# Patient Record
Sex: FEMALE | Race: WHITE | URBAN - METROPOLITAN AREA
[De-identification: names, ages, dates, MRNs, and addresses within clinical notes are randomized per-mention and may not be internally consistent; named-entity substitution may affect disease eponyms.]

---

## 2017-01-01 ENCOUNTER — INPATIENT (INPATIENT)
Facility: HOSPITAL | Age: 82
LOS: 2 days | Discharge: NURSING, REHAB OR LONG-TERM CARE | DRG: 287 | End: 2017-01-04
Attending: INTERNAL MEDICINE | Admitting: HOSPITALIST
Payer: MEDICARE

## 2017-01-01 VITALS
TEMPERATURE: 97 F | HEIGHT: 62 IN | RESPIRATION RATE: 20 BRPM | DIASTOLIC BLOOD PRESSURE: 72 MMHG | OXYGEN SATURATION: 100 % | HEART RATE: 71 BPM | SYSTOLIC BLOOD PRESSURE: 131 MMHG

## 2017-01-01 DIAGNOSIS — I24.9 ACUTE ISCHEMIC HEART DISEASE, UNSPECIFIED: ICD-10-CM

## 2017-01-01 LAB
ALBUMIN SERPL ELPH-MCNC: 3.4 G/DL — SIGNIFICANT CHANGE UP (ref 3.3–5.2)
ALP SERPL-CCNC: 124 U/L — HIGH (ref 40–120)
ALT FLD-CCNC: 14 U/L — SIGNIFICANT CHANGE UP
ANION GAP SERPL CALC-SCNC: 12 MMOL/L — SIGNIFICANT CHANGE UP (ref 5–17)
AST SERPL-CCNC: 23 U/L — SIGNIFICANT CHANGE UP
BILIRUB SERPL-MCNC: 0.4 MG/DL — SIGNIFICANT CHANGE UP (ref 0.4–2)
BUN SERPL-MCNC: 25 MG/DL — HIGH (ref 8–20)
CALCIUM SERPL-MCNC: 9.3 MG/DL — SIGNIFICANT CHANGE UP (ref 8.6–10.2)
CHLORIDE SERPL-SCNC: 101 MMOL/L — SIGNIFICANT CHANGE UP (ref 98–107)
CK SERPL-CCNC: 21 U/L — LOW (ref 25–170)
CO2 SERPL-SCNC: 27 MMOL/L — SIGNIFICANT CHANGE UP (ref 22–29)
CREAT SERPL-MCNC: 1.03 MG/DL — SIGNIFICANT CHANGE UP (ref 0.5–1.3)
D DIMER BLD IA.RAPID-MCNC: 380 NG/ML DDU — HIGH
GLUCOSE SERPL-MCNC: 87 MG/DL — SIGNIFICANT CHANGE UP (ref 70–115)
HCT VFR BLD CALC: 38 % — SIGNIFICANT CHANGE UP (ref 37–47)
HGB BLD-MCNC: 12.6 G/DL — SIGNIFICANT CHANGE UP (ref 12–16)
MCHC RBC-ENTMCNC: 32.3 PG — HIGH (ref 27–31)
MCHC RBC-ENTMCNC: 33.2 G/DL — SIGNIFICANT CHANGE UP (ref 32–36)
MCV RBC AUTO: 97.4 FL — SIGNIFICANT CHANGE UP (ref 81–99)
PLATELET # BLD AUTO: 117 K/UL — LOW (ref 150–400)
POTASSIUM SERPL-MCNC: 4.2 MMOL/L — SIGNIFICANT CHANGE UP (ref 3.5–5.3)
POTASSIUM SERPL-SCNC: 4.2 MMOL/L — SIGNIFICANT CHANGE UP (ref 3.5–5.3)
PROT SERPL-MCNC: 7.5 G/DL — SIGNIFICANT CHANGE UP (ref 6.6–8.7)
RBC # BLD: 3.9 M/UL — LOW (ref 4.4–5.2)
RBC # FLD: 14.3 % — SIGNIFICANT CHANGE UP (ref 11–15.6)
SODIUM SERPL-SCNC: 140 MMOL/L — SIGNIFICANT CHANGE UP (ref 135–145)
TROPONIN T SERPL-MCNC: <0.01 NG/ML — SIGNIFICANT CHANGE UP (ref 0–0.06)
WBC # BLD: 4.21 K/UL — LOW (ref 4.8–10.8)
WBC # FLD AUTO: 4.21 K/UL — LOW (ref 4.8–10.8)

## 2017-01-01 PROCEDURE — 71275 CT ANGIOGRAPHY CHEST: CPT | Mod: 26

## 2017-01-01 PROCEDURE — 93010 ELECTROCARDIOGRAM REPORT: CPT

## 2017-01-01 PROCEDURE — 93010 ELECTROCARDIOGRAM REPORT: CPT | Mod: 77

## 2017-01-01 PROCEDURE — 71010: CPT | Mod: 26

## 2017-01-01 PROCEDURE — 93970 EXTREMITY STUDY: CPT | Mod: 26

## 2017-01-01 PROCEDURE — 99285 EMERGENCY DEPT VISIT HI MDM: CPT | Mod: 25

## 2017-01-01 RX ORDER — POTASSIUM CHLORIDE 20 MEQ
20 PACKET (EA) ORAL
Qty: 0 | Refills: 0 | Status: DISCONTINUED | OUTPATIENT
Start: 2017-01-01 | End: 2017-01-04

## 2017-01-01 RX ORDER — SPIRONOLACTONE 25 MG/1
25 TABLET, FILM COATED ORAL DAILY
Qty: 0 | Refills: 0 | Status: DISCONTINUED | OUTPATIENT
Start: 2017-01-01 | End: 2017-01-04

## 2017-01-01 RX ORDER — CITALOPRAM 10 MG/1
10 TABLET, FILM COATED ORAL DAILY
Qty: 0 | Refills: 0 | Status: DISCONTINUED | OUTPATIENT
Start: 2017-01-01 | End: 2017-01-04

## 2017-01-01 RX ORDER — MIRTAZAPINE 45 MG/1
15 TABLET, ORALLY DISINTEGRATING ORAL AT BEDTIME
Qty: 0 | Refills: 0 | Status: DISCONTINUED | OUTPATIENT
Start: 2017-01-01 | End: 2017-01-04

## 2017-01-01 RX ORDER — LACTOBACILLUS ACIDOPHILUS 100MM CELL
1 CAPSULE ORAL DAILY
Qty: 0 | Refills: 0 | Status: DISCONTINUED | OUTPATIENT
Start: 2017-01-01 | End: 2017-01-04

## 2017-01-01 RX ORDER — FUROSEMIDE 40 MG
40 TABLET ORAL DAILY
Qty: 0 | Refills: 0 | Status: DISCONTINUED | OUTPATIENT
Start: 2017-01-01 | End: 2017-01-04

## 2017-01-01 RX ORDER — NYSTATIN CREAM 100000 [USP'U]/G
1 CREAM TOPICAL THREE TIMES A DAY
Qty: 0 | Refills: 0 | Status: DISCONTINUED | OUTPATIENT
Start: 2017-01-01 | End: 2017-01-04

## 2017-01-01 RX ORDER — DONEPEZIL HYDROCHLORIDE 10 MG/1
10 TABLET, FILM COATED ORAL AT BEDTIME
Qty: 0 | Refills: 0 | Status: DISCONTINUED | OUTPATIENT
Start: 2017-01-01 | End: 2017-01-04

## 2017-01-01 RX ORDER — LOSARTAN POTASSIUM 100 MG/1
25 TABLET, FILM COATED ORAL DAILY
Qty: 0 | Refills: 0 | Status: DISCONTINUED | OUTPATIENT
Start: 2017-01-01 | End: 2017-01-04

## 2017-01-01 RX ORDER — CARVEDILOL PHOSPHATE 80 MG/1
3.12 CAPSULE, EXTENDED RELEASE ORAL EVERY 12 HOURS
Qty: 0 | Refills: 0 | Status: DISCONTINUED | OUTPATIENT
Start: 2017-01-01 | End: 2017-01-04

## 2017-01-01 RX ORDER — LEVOTHYROXINE SODIUM 125 MCG
200 TABLET ORAL DAILY
Qty: 0 | Refills: 0 | Status: DISCONTINUED | OUTPATIENT
Start: 2017-01-01 | End: 2017-01-04

## 2017-01-01 RX ORDER — LATANOPROST 0.05 MG/ML
1 SOLUTION/ DROPS OPHTHALMIC; TOPICAL AT BEDTIME
Qty: 0 | Refills: 0 | Status: DISCONTINUED | OUTPATIENT
Start: 2017-01-01 | End: 2017-01-04

## 2017-01-01 RX ORDER — SIMVASTATIN 20 MG/1
40 TABLET, FILM COATED ORAL AT BEDTIME
Qty: 0 | Refills: 0 | Status: DISCONTINUED | OUTPATIENT
Start: 2017-01-01 | End: 2017-01-04

## 2017-01-01 RX ORDER — ENOXAPARIN SODIUM 100 MG/ML
40 INJECTION SUBCUTANEOUS DAILY
Qty: 0 | Refills: 0 | Status: DISCONTINUED | OUTPATIENT
Start: 2017-01-02 | End: 2017-01-04

## 2017-01-01 RX ORDER — DABIGATRAN ETEXILATE MESYLATE 150 MG/1
75 CAPSULE ORAL EVERY 12 HOURS
Qty: 0 | Refills: 0 | Status: DISCONTINUED | OUTPATIENT
Start: 2017-01-01 | End: 2017-01-01

## 2017-01-01 RX ADMIN — SIMVASTATIN 40 MILLIGRAM(S): 20 TABLET, FILM COATED ORAL at 23:57

## 2017-01-01 RX ADMIN — Medication 1 DROP(S): at 18:27

## 2017-01-01 RX ADMIN — CARVEDILOL PHOSPHATE 3.12 MILLIGRAM(S): 80 CAPSULE, EXTENDED RELEASE ORAL at 18:27

## 2017-01-01 RX ADMIN — Medication 20 MILLIEQUIVALENT(S): at 18:27

## 2017-01-01 RX ADMIN — DONEPEZIL HYDROCHLORIDE 10 MILLIGRAM(S): 10 TABLET, FILM COATED ORAL at 23:56

## 2017-01-01 RX ADMIN — Medication 200 MICROGRAM(S): at 10:38

## 2017-01-01 RX ADMIN — Medication 1 DROP(S): at 10:37

## 2017-01-01 RX ADMIN — Medication 40 MILLIGRAM(S): at 10:38

## 2017-01-01 RX ADMIN — NYSTATIN CREAM 1 APPLICATION(S): 100000 CREAM TOPICAL at 10:37

## 2017-01-01 RX ADMIN — Medication 1 TABLET(S): at 10:38

## 2017-01-01 RX ADMIN — CITALOPRAM 10 MILLIGRAM(S): 10 TABLET, FILM COATED ORAL at 10:38

## 2017-01-01 RX ADMIN — CARVEDILOL PHOSPHATE 3.12 MILLIGRAM(S): 80 CAPSULE, EXTENDED RELEASE ORAL at 10:37

## 2017-01-01 RX ADMIN — MIRTAZAPINE 15 MILLIGRAM(S): 45 TABLET, ORALLY DISINTEGRATING ORAL at 23:56

## 2017-01-01 RX ADMIN — Medication 20 MILLIEQUIVALENT(S): at 10:37

## 2017-01-01 RX ADMIN — LATANOPROST 1 DROP(S): 0.05 SOLUTION/ DROPS OPHTHALMIC; TOPICAL at 23:58

## 2017-01-01 NOTE — ED ADULT NURSE NOTE - PMH
Adult Hypothyroidism (ICD9 244.9)    Afib    Benign Essential Hypertension (ICD9 401.1)    Chronic pain syndrome    Enterocolitis due to Clostridium difficile  from 8/5/2016  Glaucoma    History of Transient Ischemic Attack (TIA) (ICD9 V12.54)    Hyperlipidemia LDL Goal < 100 (ICD9 272.4)    Mitral Regurgitation (ICD9 424.0)    Polymyalgia rheumatica    Rhabdomyolysis

## 2017-01-01 NOTE — ED PROVIDER NOTE - OBJECTIVE STATEMENT
An 83 year old female pt presents to the ED c/o CP. The pt was lying in bed when she had a sudden onset of right sided chest pain that she describes as a pressure that does not radiate. Pt denies any associated SOB or diaphoresis. She reports that the pain lasted for approx. 20 minutes. She was given 1 sublingual nitro en route to the ED, which she states helped her pain. Pt is from [facility]. She has a hx of pacemaker placement. No further complaints at this time.

## 2017-01-01 NOTE — H&P ADULT. - HISTORY OF PRESENT ILLNESS
82 y/o female sent from [facility] for chest pain, mid-sternal, aching, non-radiating, occurred while sitting, not associated with SOB, Nausea or vomiting, lasting only few mins. Was not given any medications prior to resolution, no prior episode. Uses a walker for safe ambulation. Denies: fever, recent illness, cough, pleuritic chest pain, reproducible chest pain, chest trauma, nausea, vomiting, diarrhea.   Cardiac history from chart consist of, pt unable to recall, CAD s/p stent in the LAD 2002, Cardiomyopathy, HFrEF, mod-severe mitral insufficiency and moderate pulmonary HTN.

## 2017-01-01 NOTE — ED PROVIDER NOTE - MEDICAL DECISION MAKING DETAILS
An 83 year old female pt presents to the ED c/o CP. Will check labs, EKG, and re-evaluate. An 83 year old female pt presents to the ED c/o CP. Will check labs, EKG, and re-evaluate.admit for acs

## 2017-01-01 NOTE — CONSULT NOTE ADULT - SUBJECTIVE AND OBJECTIVE BOX
[facility], [facility]                                   [address] [place] [address]                                                      PHONE: [phone]                                                         FAX: [phone]  [email or web address]  ---------------------------------------------------------------------------------------------------------------------------------    Reason for Consult: CP     HPI:  PADMINI VERMA is an 83y Female severe cardiomyopathy EF < 20% CAD remote LAD PCI 2002 Bio MVR 2009 moderate to severe TR/PHTN low flow aortic stenosis s/p BiV PAF on Pradaxa HFrEF who presents with one episode of chest pressure at rest ~ 10 to 15 minutes without dyspnea or orthopnea; patient complaint with CV medications; no recent cardiac work for the drop LV function      PAST MEDICAL & SURGICAL HISTORY:  Polymyalgia rheumatica  Afib  Glaucoma  Enterocolitis due to Clostridium difficile: from 8/5/2016  Chronic pain syndrome  Rhabdomyolysis  Benign Essential Hypertension (ICD9 401.1)  History of Transient Ischemic Attack (TIA) (ICD9 V12.54)  Adult Hypothyroidism (ICD9 244.9)  Mitral Regurgitation (ICD9 424.0)  Hyperlipidemia LDL Goal &lt; 100 (ICD9 272.4)  S/P cataract surgery: right eye  AICD (automatic cardioverter/defibrillator) present  S/P Knee Replacement (ICD9 V43.65)  History of Carotid Endarterectomy (ICD9 V45.89)      sulfamethoxazole (Other)      MEDICATIONS  (STANDING):  carvedilol 3.125milliGRAM(s) Oral every 12 hours  mirtazapine 15milliGRAM(s) Oral at bedtime  lactobacillus acidophilus 1Tablet(s) Oral daily  donepezil 10milliGRAM(s) Oral at bedtime  furosemide    Tablet 40milliGRAM(s) Oral daily  latanoprost 0.005% Ophthalmic Solution 1Drop(s) Both EYES at bedtime  levothyroxine 200MICROGram(s) Oral daily  Nephro-all 1Tablet(s) Oral daily  citalopram 10milliGRAM(s) Oral daily  simvastatin 40milliGRAM(s) Oral at bedtime  nystatin Powder 1Application(s) Topical three times a day  potassium chloride    Tablet ER 20milliEquivalent(s) Oral two times a day  artificial  tears Solution 1Drop(s) Both EYES two times a day  spironolactone 25milliGRAM(s) Oral daily  losartan 25milliGRAM(s) Oral daily    MEDICATIONS  (PRN):      Social History:  Cigarettes:     none               Alchohol:     none            Illicit Drug Abuse:  none    ROS: Negative other than as mentioned in HPI.    Vital Signs Last 24 Hrs  T(C): 36.4, Max: 36.6 (01-01 @ 01:51)  T(F): 97.5, Max: 97.9 (01-01 @ 01:51)  HR: 65 (59 - 71)  BP: 115/54 (97/69 - 131/72)  BP(mean): --  RR: 20 (20 - 22)  SpO2: 99% (97% - 100%)  ICU Vital Signs Last 24 Hrs  PADMINI VERMA  I&O's Detail    I&O's Summary    Drug Dosing Weight  PADMINI BAYRON      PHYSICAL EXAM:  General: Appears well developed, well nourished alert and cooperative.  HEENT: Head; normocephalic, atraumatic.  Eyes: Pupils reactive, cornea wnl.  Neck: Supple, no nodes adenopathy, no NVD or carotid bruit or thyromegaly.  CARDIOVASCULAR: Normal S1 and S2, 3/6 murmur, rub, gallop or lift.   LUNGS: No rales, rhonchi or wheeze. Normal breath sounds bilaterally.  ABDOMEN: Soft, nontender without mass or organomegaly. bowel sounds normoactive.  EXTREMITIES: No clubbing, cyanosis or edema. Distal pulses wnl.   SKIN: warm and dry with normal turgor.  NEURO: Alert/oriented x 3/normal motor exam. No pathologic reflexes.    PSYCH: normal affect.        LABS:                        12.6   4.21  )-----------( 117      ( 01 Jan 2017 01:32 )             38.0     01 Jan 2017 01:32    140    |  101    |  25.0   ----------------------------<  87     4.2     |  27.0   |  1.03     Ca    9.3        01 Jan 2017 01:32    TPro  7.5    /  Alb  3.4    /  TBili  0.4    /  DBili  x      /  AST  23     /  ALT  14     /  AlkPhos  124    01 Jan 2017 01:32    PADMINI BAYRON  CARDIAC MARKERS ( 01 Jan 2017 01:32 )  x     / <0.01 ng/mL / 21 U/L / x     / x                RADIOLOGY & ADDITIONAL STUDIES:    INTERPRETATION OF TELEMETRY (personally reviewed):    ECG: paced     ECHO: pending     CARDIAC CATHETERIZATION: pending     Assessment and Plan:    83y Female mild dementia severe cardiomyopathy EF < 20% CAD remote LAD PCI 2002 Bio MVR 2009 moderate to severe TR/PHTN low flow aortic stenosis s/p BiV PAF on Pradaxa HFrEF who presents with one episode of chest pressure at rest ~ 10 to 15 minutes without dyspnea or orthopnea; patient complaint with CV medications; no recent cardiac work for the drop LV function; no evidence of decompensated heart failure and trop negative x 1    Plan  1.  Hold Pradaxa for a right and left heart cath Tues to evaluate new drop in LV function   2.  Awaiting a repeat ECHO to re evaluate LV function and to rule out any significant valvular disease  3.  Patient should be on warfarin post cath since patient has valvular PAF   4.  Start ARB and Aldactone therapy for severe cardiomyopathy   5.  Continue Lasix and Coreg   6.  Serial trop

## 2017-01-01 NOTE — ED PROVIDER NOTE - PMH
Adult Hypothyroidism (ICD9 244.9)    Benign Essential Hypertension (ICD9 401.1)    History of Transient Ischemic Attack (TIA) (ICD9 V12.54)    Hyperlipidemia LDL Goal < 100 (ICD9 272.4)    Mitral Regurgitation (ICD9 424.0)

## 2017-01-01 NOTE — H&P ADULT. - RS GEN PE MLT RESP DETAILS PC
no intercostal retractions/no subcutaneous emphysema/no rales/respirations non-labored/clear to auscultation bilaterally/no wheezes/no rhonchi/no chest wall tenderness

## 2017-01-01 NOTE — CHART NOTE - NSCHARTNOTEFT_GEN_A_CORE
Patient lying in bed  She is in no distress  No further chest pain    Elevated BNP and leg edema    Assess Ultrasound of the lower extremity.    Atypical chest pain  cardiac enzymes  cardiac monitoring  echo  if negative may return to skilled nursing facility.

## 2017-01-01 NOTE — H&P ADULT. - GASTROINTESTINAL DETAILS
soft/no masses palpable/nontender/no rigidity/no distention/no organomegaly/normal/no bruit/no rebound tenderness/bowel sounds normal/no guarding

## 2017-01-01 NOTE — ED PROVIDER NOTE - PSH
AICD (automatic cardioverter/defibrillator) present    History of Carotid Endarterectomy (ICD9 V45.89)    S/P cataract surgery  right eye  S/P Knee Replacement (ICD9 V43.65)

## 2017-01-01 NOTE — ED ADULT NURSE NOTE - OBJECTIVE STATEMENT
zaheer received from ems room - awake alert and oriented patient on cardiac monitor showing christopher toney at 60- - zaheer states that she was laying in bed watching tv and developed chest pain across her breast bone, called the RN in the facility because it wouldn't go away - patient transferred to ER. states at this time, she denies pain at this time continue to monitor - tolerating 2 liters nasal cannula

## 2017-01-01 NOTE — H&P ADULT. - ASSESSMENT
ACS – atypical chest pain  -	Admit to telemetry  -	NC, Coreg, statin  -	Cardiology consult [facility]  -	f/u CEx2, first set negative, lipid panel and A1c  -	resume NH medications   o	A.Fib cont pradaxa

## 2017-01-02 DIAGNOSIS — E78.2 MIXED HYPERLIPIDEMIA: ICD-10-CM

## 2017-01-02 DIAGNOSIS — H40.9 UNSPECIFIED GLAUCOMA: ICD-10-CM

## 2017-01-02 DIAGNOSIS — I24.9 ACUTE ISCHEMIC HEART DISEASE, UNSPECIFIED: ICD-10-CM

## 2017-01-02 LAB
ANION GAP SERPL CALC-SCNC: 13 MMOL/L — SIGNIFICANT CHANGE UP (ref 5–17)
BUN SERPL-MCNC: 25 MG/DL — HIGH (ref 8–20)
CALCIUM SERPL-MCNC: 9.1 MG/DL — SIGNIFICANT CHANGE UP (ref 8.6–10.2)
CHLORIDE SERPL-SCNC: 102 MMOL/L — SIGNIFICANT CHANGE UP (ref 98–107)
CO2 SERPL-SCNC: 29 MMOL/L — SIGNIFICANT CHANGE UP (ref 22–29)
CREAT SERPL-MCNC: 1.11 MG/DL — SIGNIFICANT CHANGE UP (ref 0.5–1.3)
GLUCOSE SERPL-MCNC: 79 MG/DL — SIGNIFICANT CHANGE UP (ref 70–115)
HCT VFR BLD CALC: 40.4 % — SIGNIFICANT CHANGE UP (ref 37–47)
HGB BLD-MCNC: 13.2 G/DL — SIGNIFICANT CHANGE UP (ref 12–16)
MAGNESIUM SERPL-MCNC: 2.4 MG/DL — SIGNIFICANT CHANGE UP (ref 1.8–2.5)
MCHC RBC-ENTMCNC: 32.2 PG — HIGH (ref 27–31)
MCHC RBC-ENTMCNC: 32.7 G/DL — SIGNIFICANT CHANGE UP (ref 32–36)
MCV RBC AUTO: 98.5 FL — SIGNIFICANT CHANGE UP (ref 81–99)
PHOSPHATE SERPL-MCNC: 3.9 MG/DL — SIGNIFICANT CHANGE UP (ref 2.4–4.7)
PLATELET # BLD AUTO: 107 K/UL — LOW (ref 150–400)
POTASSIUM SERPL-MCNC: 4.7 MMOL/L — SIGNIFICANT CHANGE UP (ref 3.5–5.3)
POTASSIUM SERPL-SCNC: 4.7 MMOL/L — SIGNIFICANT CHANGE UP (ref 3.5–5.3)
RBC # BLD: 4.1 M/UL — LOW (ref 4.4–5.2)
RBC # FLD: 14.3 % — SIGNIFICANT CHANGE UP (ref 11–15.6)
SODIUM SERPL-SCNC: 144 MMOL/L — SIGNIFICANT CHANGE UP (ref 135–145)
WBC # BLD: 3.58 K/UL — LOW (ref 4.8–10.8)
WBC # FLD AUTO: 3.58 K/UL — LOW (ref 4.8–10.8)

## 2017-01-02 PROCEDURE — 93306 TTE W/DOPPLER COMPLETE: CPT | Mod: 26

## 2017-01-02 PROCEDURE — 99233 SBSQ HOSP IP/OBS HIGH 50: CPT

## 2017-01-02 RX ORDER — CITALOPRAM 10 MG/1
1 TABLET, FILM COATED ORAL
Qty: 0 | Refills: 0 | COMMUNITY

## 2017-01-02 RX ORDER — MIRTAZAPINE 45 MG/1
1 TABLET, ORALLY DISINTEGRATING ORAL
Qty: 0 | Refills: 0 | COMMUNITY

## 2017-01-02 RX ORDER — FUROSEMIDE 40 MG
1 TABLET ORAL
Qty: 0 | Refills: 0 | COMMUNITY

## 2017-01-02 RX ORDER — CARVEDILOL PHOSPHATE 80 MG/1
1 CAPSULE, EXTENDED RELEASE ORAL
Qty: 0 | Refills: 0 | COMMUNITY

## 2017-01-02 RX ORDER — SIMVASTATIN 20 MG/1
1 TABLET, FILM COATED ORAL
Qty: 0 | Refills: 0 | COMMUNITY

## 2017-01-02 RX ORDER — LACTOBACILLUS ACIDOPHILUS 100MM CELL
0 CAPSULE ORAL
Qty: 0 | Refills: 0 | COMMUNITY

## 2017-01-02 RX ORDER — LATANOPROST 0.05 MG/ML
1 SOLUTION/ DROPS OPHTHALMIC; TOPICAL
Qty: 0 | Refills: 0 | COMMUNITY

## 2017-01-02 RX ORDER — MENTHOL AND METHYL SALICYLATE 10; 30 G/100G; G/100G
0 STICK TOPICAL
Qty: 0 | Refills: 0 | COMMUNITY

## 2017-01-02 RX ORDER — DONEPEZIL HYDROCHLORIDE 10 MG/1
1 TABLET, FILM COATED ORAL
Qty: 0 | Refills: 0 | COMMUNITY

## 2017-01-02 RX ORDER — LATANOPROST 0.05 MG/ML
0 SOLUTION/ DROPS OPHTHALMIC; TOPICAL
Qty: 0 | Refills: 0 | COMMUNITY

## 2017-01-02 RX ORDER — POTASSIUM CHLORIDE 20 MEQ
1 PACKET (EA) ORAL
Qty: 0 | Refills: 0 | COMMUNITY

## 2017-01-02 RX ORDER — DABIGATRAN ETEXILATE MESYLATE 150 MG/1
1 CAPSULE ORAL
Qty: 0 | Refills: 0 | COMMUNITY

## 2017-01-02 RX ORDER — NYSTATIN CREAM 100000 [USP'U]/G
0 CREAM TOPICAL
Qty: 0 | Refills: 0 | COMMUNITY

## 2017-01-02 RX ORDER — LEVOTHYROXINE SODIUM 125 MCG
1 TABLET ORAL
Qty: 0 | Refills: 0 | COMMUNITY

## 2017-01-02 RX ADMIN — ENOXAPARIN SODIUM 40 MILLIGRAM(S): 100 INJECTION SUBCUTANEOUS at 13:58

## 2017-01-02 RX ADMIN — Medication 1 TABLET(S): at 13:59

## 2017-01-02 RX ADMIN — CARVEDILOL PHOSPHATE 3.12 MILLIGRAM(S): 80 CAPSULE, EXTENDED RELEASE ORAL at 06:30

## 2017-01-02 RX ADMIN — LOSARTAN POTASSIUM 25 MILLIGRAM(S): 100 TABLET, FILM COATED ORAL at 06:31

## 2017-01-02 RX ADMIN — CITALOPRAM 10 MILLIGRAM(S): 10 TABLET, FILM COATED ORAL at 13:59

## 2017-01-02 RX ADMIN — MIRTAZAPINE 15 MILLIGRAM(S): 45 TABLET, ORALLY DISINTEGRATING ORAL at 21:41

## 2017-01-02 RX ADMIN — SPIRONOLACTONE 25 MILLIGRAM(S): 25 TABLET, FILM COATED ORAL at 08:19

## 2017-01-02 RX ADMIN — Medication 1 DROP(S): at 06:34

## 2017-01-02 RX ADMIN — Medication 40 MILLIGRAM(S): at 06:31

## 2017-01-02 RX ADMIN — Medication 20 MILLIEQUIVALENT(S): at 06:31

## 2017-01-02 RX ADMIN — CARVEDILOL PHOSPHATE 3.12 MILLIGRAM(S): 80 CAPSULE, EXTENDED RELEASE ORAL at 19:36

## 2017-01-02 RX ADMIN — Medication 200 MICROGRAM(S): at 06:31

## 2017-01-02 RX ADMIN — Medication 20 MILLIEQUIVALENT(S): at 19:36

## 2017-01-02 RX ADMIN — LATANOPROST 1 DROP(S): 0.05 SOLUTION/ DROPS OPHTHALMIC; TOPICAL at 21:41

## 2017-01-02 RX ADMIN — SIMVASTATIN 40 MILLIGRAM(S): 20 TABLET, FILM COATED ORAL at 21:41

## 2017-01-02 RX ADMIN — DONEPEZIL HYDROCHLORIDE 10 MILLIGRAM(S): 10 TABLET, FILM COATED ORAL at 21:41

## 2017-01-03 LAB
ANION GAP SERPL CALC-SCNC: 12 MMOL/L — SIGNIFICANT CHANGE UP (ref 5–17)
BUN SERPL-MCNC: 32 MG/DL — HIGH (ref 8–20)
CALCIUM SERPL-MCNC: 9.2 MG/DL — SIGNIFICANT CHANGE UP (ref 8.6–10.2)
CHLORIDE SERPL-SCNC: 102 MMOL/L — SIGNIFICANT CHANGE UP (ref 98–107)
CO2 SERPL-SCNC: 27 MMOL/L — SIGNIFICANT CHANGE UP (ref 22–29)
CREAT SERPL-MCNC: 1.22 MG/DL — SIGNIFICANT CHANGE UP (ref 0.5–1.3)
GLUCOSE SERPL-MCNC: 90 MG/DL — SIGNIFICANT CHANGE UP (ref 70–115)
HCT VFR BLD CALC: 37.6 % — SIGNIFICANT CHANGE UP (ref 37–47)
HGB BLD-MCNC: 12.3 G/DL — SIGNIFICANT CHANGE UP (ref 12–16)
MAGNESIUM SERPL-MCNC: 2.4 MG/DL — SIGNIFICANT CHANGE UP (ref 1.8–2.5)
MCHC RBC-ENTMCNC: 32 PG — HIGH (ref 27–31)
MCHC RBC-ENTMCNC: 32.7 G/DL — SIGNIFICANT CHANGE UP (ref 32–36)
MCV RBC AUTO: 97.9 FL — SIGNIFICANT CHANGE UP (ref 81–99)
PHOSPHATE SERPL-MCNC: 4.1 MG/DL — SIGNIFICANT CHANGE UP (ref 2.4–4.7)
PLATELET # BLD AUTO: 110 K/UL — LOW (ref 150–400)
POTASSIUM SERPL-MCNC: 4.8 MMOL/L — SIGNIFICANT CHANGE UP (ref 3.5–5.3)
POTASSIUM SERPL-SCNC: 4.8 MMOL/L — SIGNIFICANT CHANGE UP (ref 3.5–5.3)
RBC # BLD: 3.84 M/UL — LOW (ref 4.4–5.2)
RBC # FLD: 14.2 % — SIGNIFICANT CHANGE UP (ref 11–15.6)
SODIUM SERPL-SCNC: 141 MMOL/L — SIGNIFICANT CHANGE UP (ref 135–145)
TSH SERPL-MCNC: 0.65 UIU/ML — SIGNIFICANT CHANGE UP (ref 0.49–4.67)
WBC # BLD: 4.11 K/UL — LOW (ref 4.8–10.8)
WBC # FLD AUTO: 4.11 K/UL — LOW (ref 4.8–10.8)

## 2017-01-03 PROCEDURE — 99233 SBSQ HOSP IP/OBS HIGH 50: CPT

## 2017-01-03 RX ADMIN — DONEPEZIL HYDROCHLORIDE 10 MILLIGRAM(S): 10 TABLET, FILM COATED ORAL at 22:33

## 2017-01-03 RX ADMIN — SIMVASTATIN 40 MILLIGRAM(S): 20 TABLET, FILM COATED ORAL at 22:36

## 2017-01-03 RX ADMIN — SPIRONOLACTONE 25 MILLIGRAM(S): 25 TABLET, FILM COATED ORAL at 06:29

## 2017-01-03 RX ADMIN — LOSARTAN POTASSIUM 25 MILLIGRAM(S): 100 TABLET, FILM COATED ORAL at 06:30

## 2017-01-03 RX ADMIN — Medication 1 TABLET(S): at 17:25

## 2017-01-03 RX ADMIN — Medication 40 MILLIGRAM(S): at 06:29

## 2017-01-03 RX ADMIN — CARVEDILOL PHOSPHATE 3.12 MILLIGRAM(S): 80 CAPSULE, EXTENDED RELEASE ORAL at 17:25

## 2017-01-03 RX ADMIN — Medication 1 DROP(S): at 06:30

## 2017-01-03 RX ADMIN — NYSTATIN CREAM 1 APPLICATION(S): 100000 CREAM TOPICAL at 06:30

## 2017-01-03 RX ADMIN — CARVEDILOL PHOSPHATE 3.12 MILLIGRAM(S): 80 CAPSULE, EXTENDED RELEASE ORAL at 06:30

## 2017-01-03 RX ADMIN — Medication 200 MICROGRAM(S): at 06:30

## 2017-01-03 RX ADMIN — NYSTATIN CREAM 1 APPLICATION(S): 100000 CREAM TOPICAL at 17:27

## 2017-01-03 RX ADMIN — LATANOPROST 1 DROP(S): 0.05 SOLUTION/ DROPS OPHTHALMIC; TOPICAL at 22:36

## 2017-01-03 RX ADMIN — Medication 1 DROP(S): at 17:26

## 2017-01-03 RX ADMIN — MIRTAZAPINE 15 MILLIGRAM(S): 45 TABLET, ORALLY DISINTEGRATING ORAL at 22:36

## 2017-01-03 RX ADMIN — CITALOPRAM 10 MILLIGRAM(S): 10 TABLET, FILM COATED ORAL at 17:25

## 2017-01-03 RX ADMIN — Medication 20 MILLIEQUIVALENT(S): at 17:24

## 2017-01-03 RX ADMIN — Medication 20 MILLIEQUIVALENT(S): at 06:29

## 2017-01-03 NOTE — DISCHARGE NOTE ADULT - SECONDARY DIAGNOSIS.
Chronic atrial fibrillation Essential hypertension Mixed hyperlipidemia Open-angle glaucoma, unspecified glaucoma stage, unspecified laterality, unspecified open-angle glaucoma type

## 2017-01-03 NOTE — DISCHARGE NOTE ADULT - PATIENT PORTAL LINK FT
“You can access the [facility] Patient Portal, offered by [facility], by registering with the following website: [email or web address]”

## 2017-01-03 NOTE — DISCHARGE NOTE ADULT - MEDICATION SUMMARY - MEDICATIONS TO TAKE
I will START or STAY ON the medications listed below when I get home from the hospital:    spironolactone 25 mg oral tablet  -- 1 tab(s) by mouth once a day  -- Indication: For ChF    losartan 25 mg oral tablet  -- 1 tab(s) by mouth once a day  -- Indication: For ChF    dabigatran 75 mg oral capsule  -- 1 cap(s) by mouth 2 times a day  -- Indication: For A Fib    mirtazapine 15 mg oral tablet  -- 1 tab(s) by mouth once a day (at bedtime)  -- Indication: For Depression    citalopram 10 mg oral tablet  -- 1 tab(s) by mouth once a day  -- Indication: For Depression    simvastatin 40 mg oral tablet  -- 1 tab(s) by mouth once a day (at bedtime)  -- Indication: For Hyperlipidemia    carvedilol 3.125 mg oral tablet  -- 1 tab(s) by mouth 2 times a day  -- Indication: For HTN    donepezil 10 mg oral tablet  -- 1 tab(s) by mouth once a day (at bedtime)  -- Indication: For Dementia    nystatin 100,000 units/g topical powder  -- Apply on skin to affected area 2 times a day  -- Indication: For Rash    menthol 5% topical pad  -- Apply on skin to affected area once a day (at bedtime). take off in morning  -- Indication: For Rash    furosemide 40 mg oral tablet  -- 1 tab(s) by mouth 2 times a day  -- Indication: For ChF    potassium chloride 20 mEq oral tablet, extended release  -- 1 tab(s) by mouth 2 times a day  -- Indication: For Potassium Deficiency    Lubricant Eye Drops  -- 1 drop(s) to each affected eye 2 times a day  -- Indication: For Dry eyes    latanoprost 0.005% ophthalmic solution  -- 1 drop(s) to each affected eye once a day (in the evening)  -- Indication: For Glaucoma    Acidophilus oral capsule  -- 1 cap(s) by mouth 2 times a day  -- Indication: For Supplement    levothyroxine 200 mcg (0.2 mg) oral tablet  -- 1 tab(s) by mouth once a day  -- Indication: For Hypothyroid    multivitamin  -- 1 tab(s) by mouth once a day  -- Indication: For Supplement

## 2017-01-03 NOTE — DISCHARGE NOTE ADULT - PLAN OF CARE
No heavy lifting, driving, sex, tub baths, swimming, or any activity that submerges the lower half of the body in water for 48 hours.  Limited walking and stairs for 48 hours.    Change the bandaid after 24 hours and every 24 hours after that.  Keep the puncture site dry and covered with a bandaid until a scab forms.    Observe the site frequently.  If bleeding or a large lump (the size of a golf ball or bigger) occurs lie flat, apply continuous direct pressure just above the puncture site for at least 10 minutes, and notify your physician immediately.  If the bleeding cannot be controlled, call 911 immediately for assistance.  Notify your physician of pain, swelling or any drainage.    Notify your physician immediately if coldness, numbness, discoloration or pain in your foot occurs. Follow up with cardiology Continue pradaxa Monitor BP Monitor LDL Continue drops

## 2017-01-03 NOTE — DISCHARGE NOTE ADULT - HOSPITAL COURSE
84 y/o female sent from [facility] for chest pain, mid-sternal, aching, non-radiating, occurred while sitting, not associated with SOB, Nausea or vomiting, lasting only few mins. Was not given any medications prior to resolution, no prior episode. Uses a walker for safe ambulation. Denies: fever, recent illness, cough, pleuritic chest pain, reproducible chest pain, chest trauma, nausea, vomiting, diarrhea.   Cardiac history from chart consist of, pt unable to recall, CAD s/p stent in the LAD 2002, Cardiomyopathy, HFrEF, mod-severe mitral insufficiency and moderate pulmonary HTN.   Patient was admitted to telemetry. She under went cardiac catheterization which revealed aortic stenosis, no severe coronary artery disease. The patient does not want to pursue any other invasive test at this time such as JOAN.  She is scheduled to return to [facility] and follow up with [facility] cardiology as an outpatient.

## 2017-01-03 NOTE — DISCHARGE NOTE ADULT - CARE PLAN
Instructions for follow-up, activity and diet:	No heavy lifting, driving, sex, tub baths, swimming, or any activity that submerges the lower half of the body in water for 48 hours.  Limited walking and stairs for 48 hours.    Change the bandaid after 24 hours and every 24 hours after that.  Keep the puncture site dry and covered with a bandaid until a scab forms.    Observe the site frequently.  If bleeding or a large lump (the size of a golf ball or bigger) occurs lie flat, apply continuous direct pressure just above the puncture site for at least 10 minutes, and notify your physician immediately.  If the bleeding cannot be controlled, call 911 immediately for assistance.  Notify your physician of pain, swelling or any drainage.    Notify your physician immediately if coldness, numbness, discoloration or pain in your foot occurs. Principal Discharge DX:	ACS (acute coronary syndrome)  Goal:	Follow up with cardiology  Instructions for follow-up, activity and diet:	No heavy lifting, driving, sex, tub baths, swimming, or any activity that submerges the lower half of the body in water for 48 hours.  Limited walking and stairs for 48 hours.    Change the bandaid after 24 hours and every 24 hours after that.  Keep the puncture site dry and covered with a bandaid until a scab forms.    Observe the site frequently.  If bleeding or a large lump (the size of a golf ball or bigger) occurs lie flat, apply continuous direct pressure just above the puncture site for at least 10 minutes, and notify your physician immediately.  If the bleeding cannot be controlled, call 911 immediately for assistance.  Notify your physician of pain, swelling or any drainage.    Notify your physician immediately if coldness, numbness, discoloration or pain in your foot occurs.  Secondary Diagnosis:	Chronic atrial fibrillation  Goal:	Continue pradaxa  Secondary Diagnosis:	Essential hypertension  Instructions for follow-up, activity and diet:	Monitor BP  Secondary Diagnosis:	Mixed hyperlipidemia  Instructions for follow-up, activity and diet:	Monitor LDL  Secondary Diagnosis:	Open-angle glaucoma, unspecified glaucoma stage, unspecified laterality, unspecified open-angle glaucoma type  Instructions for follow-up, activity and diet:	Continue drops

## 2017-01-04 VITALS
SYSTOLIC BLOOD PRESSURE: 104 MMHG | TEMPERATURE: 98 F | DIASTOLIC BLOOD PRESSURE: 66 MMHG | RESPIRATION RATE: 20 BRPM | HEART RATE: 64 BPM | OXYGEN SATURATION: 96 %

## 2017-01-04 PROCEDURE — 84484 ASSAY OF TROPONIN QUANT: CPT

## 2017-01-04 PROCEDURE — 99285 EMERGENCY DEPT VISIT HI MDM: CPT | Mod: 25

## 2017-01-04 PROCEDURE — C1889: CPT

## 2017-01-04 PROCEDURE — 82550 ASSAY OF CK (CPK): CPT

## 2017-01-04 PROCEDURE — 99239 HOSP IP/OBS DSCHRG MGMT >30: CPT

## 2017-01-04 PROCEDURE — 93306 TTE W/DOPPLER COMPLETE: CPT

## 2017-01-04 PROCEDURE — 80053 COMPREHEN METABOLIC PANEL: CPT

## 2017-01-04 PROCEDURE — C1887: CPT

## 2017-01-04 PROCEDURE — 93460 R&L HRT ART/VENTRICLE ANGIO: CPT

## 2017-01-04 PROCEDURE — 80048 BASIC METABOLIC PNL TOTAL CA: CPT

## 2017-01-04 PROCEDURE — 36415 COLL VENOUS BLD VENIPUNCTURE: CPT

## 2017-01-04 PROCEDURE — 85027 COMPLETE CBC AUTOMATED: CPT

## 2017-01-04 PROCEDURE — 71275 CT ANGIOGRAPHY CHEST: CPT

## 2017-01-04 PROCEDURE — 93970 EXTREMITY STUDY: CPT

## 2017-01-04 PROCEDURE — C1894: CPT

## 2017-01-04 PROCEDURE — 71045 X-RAY EXAM CHEST 1 VIEW: CPT

## 2017-01-04 PROCEDURE — 84443 ASSAY THYROID STIM HORMONE: CPT

## 2017-01-04 PROCEDURE — 84100 ASSAY OF PHOSPHORUS: CPT

## 2017-01-04 PROCEDURE — 85379 FIBRIN DEGRADATION QUANT: CPT

## 2017-01-04 PROCEDURE — 93005 ELECTROCARDIOGRAM TRACING: CPT

## 2017-01-04 PROCEDURE — C1769: CPT

## 2017-01-04 PROCEDURE — 83735 ASSAY OF MAGNESIUM: CPT

## 2017-01-04 RX ORDER — LOSARTAN POTASSIUM 100 MG/1
1 TABLET, FILM COATED ORAL
Qty: 0 | Refills: 0 | COMMUNITY
Start: 2017-01-04

## 2017-01-04 RX ORDER — ACETAMINOPHEN 500 MG
3 TABLET ORAL
Qty: 0 | Refills: 0 | COMMUNITY

## 2017-01-04 RX ORDER — SPIRONOLACTONE 25 MG/1
1 TABLET, FILM COATED ORAL
Qty: 0 | Refills: 0 | COMMUNITY
Start: 2017-01-04

## 2017-01-04 RX ORDER — ACETAMINOPHEN 500 MG
2 TABLET ORAL
Qty: 0 | Refills: 0 | COMMUNITY

## 2017-01-04 RX ORDER — DABIGATRAN ETEXILATE MESYLATE 150 MG/1
75 CAPSULE ORAL EVERY 12 HOURS
Qty: 0 | Refills: 0 | Status: DISCONTINUED | OUTPATIENT
Start: 2017-01-04 | End: 2017-01-04

## 2017-01-04 RX ADMIN — SPIRONOLACTONE 25 MILLIGRAM(S): 25 TABLET, FILM COATED ORAL at 06:50

## 2017-01-04 RX ADMIN — Medication 20 MILLIEQUIVALENT(S): at 06:50

## 2017-01-04 RX ADMIN — Medication 1 TABLET(S): at 12:49

## 2017-01-04 RX ADMIN — LOSARTAN POTASSIUM 25 MILLIGRAM(S): 100 TABLET, FILM COATED ORAL at 06:50

## 2017-01-04 RX ADMIN — CITALOPRAM 10 MILLIGRAM(S): 10 TABLET, FILM COATED ORAL at 12:49

## 2017-01-04 RX ADMIN — CARVEDILOL PHOSPHATE 3.12 MILLIGRAM(S): 80 CAPSULE, EXTENDED RELEASE ORAL at 06:50

## 2017-01-04 RX ADMIN — Medication 1 DROP(S): at 06:53

## 2017-01-04 RX ADMIN — Medication 40 MILLIGRAM(S): at 06:50

## 2017-01-04 RX ADMIN — Medication 200 MICROGRAM(S): at 06:50

## 2017-01-04 NOTE — PROGRESS NOTE ADULT - ASSESSMENT
83 year old female weakness
1. cp-cath documented patent lad stent and low cad-medical Rx.  2. cardiomyopathy with EF of 25-30% (systolic and diastolic chronic) ischemic and valvular. Known AS and prior bio MVR.  3. aortic stenosis-severe by echo and not by cath- a JOAN would resolve question but pt [facility].  4.aicd/pacer
83 year old female weakness
Assessment  Systolic heart failure with decline in EF ? CAd vs sig AS  PAF on NOAC  BiV ICD    Rec  R and L cath in am  Likely JOAN on wed to assess AS

## 2017-01-04 NOTE — PROGRESS NOTE ADULT - SUBJECTIVE AND OBJECTIVE BOX
Chief Complaint: chart & hx reviewed.    HPI: 82 yo F with cad (stent and bio avr) adm with episode of chest pain. Cathed yesterday with generally low grade cad/mod PHTN and severe TR. No significant AS. But echo showed severe AS with ASAEL 0,91cm2 and severe TR and PASP of 50. Hx of bio MVR and aicd.    PAST MEDICAL & SURGICAL HISTORY:  Polymyalgia rheumatica  Afib  Glaucoma  Enterocolitis due to Clostridium difficile: from 8/5/2016  Chronic pain syndrome  Rhabdomyolysis  Benign Essential Hypertension (ICD9 401.1)  History of Transient Ischemic Attack (TIA) (ICD9 V12.54)  Adult Hypothyroidism (ICD9 244.9)  Mitral Regurgitation (ICD9 424.0)  Hyperlipidemia LDL Goal &lt; 100 (ICD9 272.4)  S/P cataract surgery: right eye  AICD (automatic cardioverter/defibrillator) present  S/P Knee Replacement (ICD9 V43.65)  History of Carotid Endarterectomy (ICD9 V45.89)      PREVIOUS DIAGNOSTIC TESTING:      ECHO  FINDINGS: see above    STRESS  FINDINGS:    CATHETERIZATION see above  FINDINGS:    MEDICATIONS  (STANDING):  carvedilol 3.125milliGRAM(s) Oral every 12 hours  mirtazapine 15milliGRAM(s) Oral at bedtime  lactobacillus acidophilus 1Tablet(s) Oral daily  donepezil 10milliGRAM(s) Oral at bedtime  furosemide    Tablet 40milliGRAM(s) Oral daily  latanoprost 0.005% Ophthalmic Solution 1Drop(s) Both EYES at bedtime  levothyroxine 200MICROGram(s) Oral daily  Nephro-all 1Tablet(s) Oral daily  citalopram 10milliGRAM(s) Oral daily  simvastatin 40milliGRAM(s) Oral at bedtime  nystatin Powder 1Application(s) Topical three times a day  potassium chloride    Tablet ER 20milliEquivalent(s) Oral two times a day  artificial  tears Solution 1Drop(s) Both EYES two times a day  spironolactone 25milliGRAM(s) Oral daily  losartan 25milliGRAM(s) Oral daily  enoxaparin Injectable 40milliGRAM(s) SubCutaneous daily    MEDICATIONS  (PRN):      FAMILY HISTORY:  No pertinent family history in first degree relatives      ROS: Negative other than as mentioned in HPI.    Vital Signs Last 24 Hrs  T(C): 36.7, Max: 36.7 (01-04 @ 06:46)  T(F): 98, Max: 98 (01-04 @ 06:46)  HR: 66 (61 - 79)  BP:  difficult to obtain bilaterally- suggests systolic approx 80  BP(mean): --  RR: 14 flat (16 - 20)  SpO2: 93% (93% - 98%)    PHYSICAL EXAM:  General: Appears well developed, well nourished alert and cooperative. obese  HEENT: Head; normocephalic, atraumatic.  Eyes;   Pupils reactive, cornea wnl.  Neck; Supple, no nodes adenopathy, no NVD or carotid bruit or thyromegaly.  CARDIOVASCULAR; 2/6 basal systolic Murmur with +S2. and 3/6 holosystolic murmur at lsb, No rub, gallop or lift. Normal S1 and S2.  LUNGS; No rales, rhonchi or wheeze. Normal breath sounds bilaterally.  ABDOMEN ; Soft, nontender without mass or organomegaly. bowel sounds normoactive.  EXTREMITIES; No clubbing, cyanosis. 1-2+ pretib edema. Distal pulses wnl. ROM normal. Cath site right groin clean.  SKIN; warm and dry with normal turgor.  NEURO; Alert/oriented x 3/normal motor exam. No pathologic reflexes.    PSYCH; normal affect.            INTERPRETATION OF TELEMETRY:    ECG: paced    I&O's Detail      LABS:                        12.3   4.11  )-----------( 110      ( 03 Jan 2017 06:32 )             37.6     03 Jan 2017 06:33    141    |  102    |  32.0   ----------------------------<  90     4.8     |  27.0   |  1.22     Ca    9.2        03 Jan 2017 06:33  Phos  4.1       03 Jan 2017 06:33  Mg     2.4       03 Jan 2017 06:33              I&O's Summary      RADIOLOGY & ADDITIONAL STUDIES:
PADMINI VERMA     Chief Complaint: Patient is a 83y old  Female who presents with a chief complaint of Chest pain (01 Jan 2017 04:53)      PAST MEDICAL & SURGICAL HISTORY:  Polymyalgia rheumatica  Afib  Glaucoma  Enterocolitis due to Clostridium difficile: from 8/5/2016  Chronic pain syndrome  Rhabdomyolysis  Benign Essential Hypertension (ICD9 401.1)  History of Transient Ischemic Attack (TIA) (ICD9 V12.54)  Adult Hypothyroidism (ICD9 244.9)  Mitral Regurgitation (ICD9 424.0)  Hyperlipidemia LDL Goal &lt; 100 (ICD9 272.4)  S/P cataract surgery: right eye  AICD (automatic cardioverter/defibrillator) present  S/P Knee Replacement (ICD9 V43.65)  History of Carotid Endarterectomy (ICD9 V45.89)      HPI/OVERNIGHT EVENTS: Patient in no apparent distress    MEDICATIONS  (STANDING):  carvedilol 3.125milliGRAM(s) Oral every 12 hours  mirtazapine 15milliGRAM(s) Oral at bedtime  lactobacillus acidophilus 1Tablet(s) Oral daily  donepezil 10milliGRAM(s) Oral at bedtime  furosemide    Tablet 40milliGRAM(s) Oral daily  latanoprost 0.005% Ophthalmic Solution 1Drop(s) Both EYES at bedtime  levothyroxine 200MICROGram(s) Oral daily  Nephro-all 1Tablet(s) Oral daily  citalopram 10milliGRAM(s) Oral daily  simvastatin 40milliGRAM(s) Oral at bedtime  nystatin Powder 1Application(s) Topical three times a day  potassium chloride    Tablet ER 20milliEquivalent(s) Oral two times a day  artificial  tears Solution 1Drop(s) Both EYES two times a day  spironolactone 25milliGRAM(s) Oral daily  losartan 25milliGRAM(s) Oral daily  enoxaparin Injectable 40milliGRAM(s) SubCutaneous daily      Allergies: sulfamethoxazole (Other)      REVIEW OF SYSTEMS:    CONSTITUTIONAL: No fever  ENMT:  No difficulty hearing, tinnitus, vertigo; No sinus or throat pain  NECK: No pain or stiffness  RESPIRATORY: No cough, wheezing, chills or hemoptysis; No shortness of breath  CARDIOVASCULAR: No chest pain, palpitations, dizziness, or leg swelling  GASTROINTESTINAL: No abdominal or epigastric pain. No nausea, vomiting, or hematemesis; No diarrhea or constipation. No melena or hematochezia.  GENITOURINARY: No dysuria, frequency, hematuria, or incontinence  NEUROLOGICAL: No headaches, memory loss, loss of strength, numbness, or tremors  SKIN: No itching, burning, rashes, or lesions   MUSCULOSKELETAL: No joint pain or swelling; No muscle, back, or extremity pain  PSYCHIATRIC: No depression, anxiety, mood swings, or difficulty sleeping    Vital Signs Last 24 Hrs  T(C): 36.6, Max: 36.7 (01-01 @ 20:15)  T(F): 97.9, Max: 98 (01-01 @ 20:15)  HR: 74 (62 - 74)  BP: 137/59 (127/74 - 137/67)  BP(mean): --  RR: 18 (18 - 20)  SpO2: 95% (94% - 95%)    PHYSICAL EXAM:  Constitutional: NAD, well-groomed, well-developed  HEENT: PERRLA, EOMI, Normal Hearing, MMM  Neck: No LAD, No JVD  Back: Normal spine flexure, No CVA tenderness  Respiratory: CTAB Cardiovascular: S1 and S2, RRR, no M/G/R  Gastrointestinal: BS+, soft, NT/ND  Extremities: No peripheral edema  Vascular: 2+ peripheral pulses  Neurological: A/O x 3, no focal deficits  Psychiatric: Normal mood, normal affect  Musculoskeletal: 5/5 strength b/l upper and lower extremities  Skin: No rashes        CAPILLARY BLOOD GLUCOSE    LABS:                        13.2   3.58  )-----------( 107      ( 02 Jan 2017 05:29 )             40.4     02 Jan 2017 05:29    144    |  102    |  25.0   ----------------------------<  79     4.7     |  29.0   |  1.11     Ca    9.1        02 Jan 2017 05:29  Phos  3.9       02 Jan 2017 05:29  Mg     2.4       02 Jan 2017 05:29    TPro  7.5    /  Alb  3.4    /  TBili  0.4    /  DBili  x      /  AST  23     /  ALT  14     /  AlkPhos  124    01 Jan 2017 01:32          RADIOLOGY & ADDITIONAL TESTS:
SUBJECTIVE:Sp C NP F/U note:  HPI: 82 yo F with cad (stent and bio avr) adm with episode of chest pain. Cathed yesterday with generally low grade cad/mod PHTN and severe TR. No significant AS. But echo showed severe AS with ASAEL 0,91cm2 and severe TR and PASP of 50. Hx of bio MVR and aicd.  denies complaints of chest pain/sob/dizziness/palps/OOB colten diet.     	  MEDICATIONS:  carvedilol 3.125milliGRAM(s) Oral every 12 hours  furosemide    Tablet 40milliGRAM(s) Oral daily  spironolactone 25milliGRAM(s) Oral daily  losartan 25milliGRAM(s) Oral daily        mirtazapine 15milliGRAM(s) Oral at bedtime  donepezil 10milliGRAM(s) Oral at bedtime  citalopram 10milliGRAM(s) Oral daily      levothyroxine 200MICROGram(s) Oral daily  simvastatin 40milliGRAM(s) Oral at bedtime    latanoprost 0.005% Ophthalmic Solution 1Drop(s) Both EYES at bedtime  Nephro-all 1Tablet(s) Oral daily  nystatin Powder 1Application(s) Topical three times a day  potassium chloride    Tablet ER 20milliEquivalent(s) Oral two times a day  artificial  tears Solution 1Drop(s) Both EYES two times a day  enoxaparin Injectable 40milliGRAM(s) SubCutaneous daily        PHYSICAL EXAM:    T(C): 36.7, Max: 36.7 (-04 @ 06:46)  HR: 66 (61 - 79)  BP: 118/70 (110/70 - 176/69)  RR: 20 (16 - 20)  SpO2: 93% (93% - 98%)  Wt(kg): --    I&O's Summary      Daily     Daily Weight in k.1 (2017 05:54)    Appearance: Normal	  HEENT:   Normal oral mucosa, 	  Lymphatic: No lymphadenopathy  Cardiovascular: Normal S1 S2, RRR 70  No JVD, 2/6 ROLANDO LSB    Respiratory: Lungs clear to auscultation	  Psychiatry: A & O x 3, Mood & affect appropriate  Gastrointestinal:  Soft, Non-tender, + BS	  Skin: , No ecchymoses, No cyanosis  Neurologic: Non-focal  Extremities: Normal range of motion, No clubbing, cyanosis lower extremities with chronic edema and venous stasis changes Right groin no hematoma, no swelling dressing removed.   Vascular: Peripheral pulses palpable 2+ bilaterally    TELEMETRY:  Vpaced. 70	    RADIOLOGY:   DIAGNOSTIC TESTING:  [ ] Echocardiogram:  [X ]  Catheterization:  VENTRICLES: EF 35% w/ mild MR.  CORONARY VESSELS: R dominant.  Large RCA.  Large LAD w/ mild ISR of mid vessel stent.  Moderate sized LCx w/ mild disease.  COMPLICATIONS: No complications occurred during the cath lab visit.  DIAGNOSTIC IMPRESSIONS: Moderate pulmonary HTN w/ ventricularization of RA  tracing c/w severe TR.  Unable to wedge-though LVEDP low.  Severe LV dysfunction/bioprosthetic MVR fxning well w/ no sig MR.  No sig LV/Ao gradient.  Mild ISR of LAD.  DIAGNOSTIC RECOMMENDATIONS: Medical therapy.  INTERVENTIONAL IMPRESSIONS: Moderate pulmonary HTN w/ ventricularization of  RA tracing c/w severe TR.  Unable to wedge-though LVEDP low.  Severe LV dysfunction/bioprosthetic MVR fxning well w/ no sig MR.  No sig LV/Ao gradient.  Mild ISR of LAD.  INTERVENTIONAL RECOMMENDATIONS: Medical therapy.  [ ] Stress Test:    OTHER: 	    LABS:	 	    CARDIAC MARKERS:                                  12.3   4.11  )-----------( 110      ( 2017 06:32 )             37.6     2017 06:33    141    |  102    |  32.0   ----------------------------<  90     4.8     |  27.0   |  1.22     Ca    9.2        2017 06:33  Phos  4.1       2017 06:33  Mg     2.4       2017 06:33      proBNP:   Lipid Profile:   HgA1c:   TSH:     ASSESSMENT:   HPI: 82 yo F with cad (stent and bio avr) adm with episode of chest pain. Cathed yesterday with generally low grade cad/mod PHTN and severe TR. No significant AS. But echo showed severe AS with ASAEL 0,91cm2 and severe TR and PASP of 50. Hx of bio MVR and aicd.  denies complaints of chest pain/sob/dizziness/palps/OOB colten diet.   -SP: cath as above  cardiomyopathy with EF of 25-30% (systolic and diastolic chronic) ischemic and valvular. Known AS and prior bio MVR.  AICD  Plan:  Medical management/ resume Pradaxa   d/c Back to [facility] per pt. request/ declines JOAN  Continue current meds and management.
[facility]                                   [address] [place] [address]                                                      PHONE: [phone]                                                         FAX: [phone]  [email or web address]  ---------------------------------------------------------------------------------------------------------------------------------    Overnight events/patient complaints: lying flat comfortably      sulfamethoxazole (Other)    MEDICATIONS  (STANDING):  carvedilol 3.125milliGRAM(s) Oral every 12 hours  mirtazapine 15milliGRAM(s) Oral at bedtime  lactobacillus acidophilus 1Tablet(s) Oral daily  donepezil 10milliGRAM(s) Oral at bedtime  furosemide    Tablet 40milliGRAM(s) Oral daily  latanoprost 0.005% Ophthalmic Solution 1Drop(s) Both EYES at bedtime  levothyroxine 200MICROGram(s) Oral daily  Nephro-all 1Tablet(s) Oral daily  citalopram 10milliGRAM(s) Oral daily  simvastatin 40milliGRAM(s) Oral at bedtime  nystatin Powder 1Application(s) Topical three times a day  potassium chloride    Tablet ER 20milliEquivalent(s) Oral two times a day  artificial  tears Solution 1Drop(s) Both EYES two times a day  spironolactone 25milliGRAM(s) Oral daily  losartan 25milliGRAM(s) Oral daily  enoxaparin Injectable 40milliGRAM(s) SubCutaneous daily    MEDICATIONS  (PRN):      Vital Signs Last 24 Hrs  T(C): 36.7, Max: 36.7 (01-01 @ 20:15)  T(F): 98.1, Max: 98.1 (01-02 @ 18:30)  HR: 61 (61 - 74)  BP: 133/63 (127/74 - 137/67)  BP(mean): --  RR: 18 (18 - 20)  SpO2: 95% (94% - 95%)  Daily     Daily   ICU Vital Signs Last 24 Hrs  PADMINIKURT CRANEJESENIA  I&O's Detail    I&O's Summary    Drug Dosing Weight  PADMINI VERMA      PHYSICAL EXAM:  General: Appears well developed, well nourished alert and cooperative.  HEENT: Head; normocephalic, atraumatic.  Eyes: Pupils reactive, cornea wnl.  Neck:decreased upstrokes.  CARDIOVASCULAR: Normal [id number] S2 3/6 As murmur.   LUNGS: No rales, rhonchi or wheeze. Normal breath sounds bilaterally.  ABDOMEN: Soft, nontender without mass or organomegaly. bowel sounds normoactive.  EXTREMITIES: No clubbing, cyanosis or edema. Distal pulses wnl.   SKIN: warm and dry with normal turgor.  NEURO: Alert/oriented x 3/normal motor exam. No pathologic reflexes.    PSYCH: normal affect.        LABS:                        13.2   3.58  )-----------( 107      ( 02 Jan 2017 05:29 )             40.4     02 Jan 2017 05:29    144    |  102    |  25.0   ----------------------------<  79     4.7     |  29.0   |  1.11     Ca    9.1        02 Jan 2017 05:29  Phos  3.9       02 Jan 2017 05:29  Mg     2.4       02 Jan 2017 05:29    TPro  7.5    /  Alb  3.4    /  TBili  0.4    /  DBili  x      /  AST  23     /  ALT  14     /  AlkPhos  124    01 Jan 2017 01:32    PADMINI BAYRON  CARDIAC MARKERS ( 01 Jan 2017 01:32 )  x     / <0.01 ng/mL / 21 U/L / x     / x                RADIOLOGY & ADDITIONAL STUDIES:Impression:    Left midlung linear atelectasis or scarring.    Cardiomegaly.                        HUGO MCDONALD M.D., ATTENDING RADIOLOGIST  This document has been electronically signed. Jan 1 2017  9:30AM        INTERPRETATION OF TELEMETRY (personally reviewed):    ECG:    ECHO:Summary:   1. Left ventricular ejection fraction, by visual estimation, is 25 to   30%.   2. Severely decreased global left ventricular systolic function.   3. Severely dilated right atrium.   4. A bioprosthetic valve is present in the mitral position.   5. Severe tricuspid regurgitation.   6. Mild aortic regurgitation.   7. Estimated pulmonary artery systolic pressure is 46.9 mmHg assuming a   right atrial pressure of 15 mmHg, which is consistent with mild pulmonary   hypertension.   8. Peak transaortic gradient equals 32.7 mmHg, mean transaortic gradient   equals 19.8 mmHg, the calculated aortic valve area equals 0.91 cm² by the   continuity equation consistent with severe aortic stenosis. There appears   to be adequate aortic leaflet separation and hence valve area may not   represent true aortic pathology. If clinically warranted, consider JOAN.   9. Severely enlarged left atrium.     MD Ulises Electronically signed on 1/2/2017 at 2:04:12 PM
PADMINI VERMA     Chief Complaint: Patient is a 83y old  Female who presents with a chief complaint of Chest pain (01 Jan 2017 04:53)      PAST MEDICAL & SURGICAL HISTORY:  Polymyalgia rheumatica  Afib  Glaucoma  Enterocolitis due to Clostridium difficile: from 8/5/2016  Chronic pain syndrome  Rhabdomyolysis  Benign Essential Hypertension (ICD9 401.1)  History of Transient Ischemic Attack (TIA) (ICD9 V12.54)  Adult Hypothyroidism (ICD9 244.9)  Mitral Regurgitation (ICD9 424.0)  Hyperlipidemia LDL Goal &lt; 100 (ICD9 272.4)  S/P cataract surgery: right eye  AICD (automatic cardioverter/defibrillator) present  S/P Knee Replacement (ICD9 V43.65)  History of Carotid Endarterectomy (ICD9 V45.89)      HPI/OVERNIGHT EVENTS: Patient lying in bed. She is scheduled for cath today.    MEDICATIONS  (STANDING):  carvedilol 3.125milliGRAM(s) Oral every 12 hours  mirtazapine 15milliGRAM(s) Oral at bedtime  lactobacillus acidophilus 1Tablet(s) Oral daily  donepezil 10milliGRAM(s) Oral at bedtime  furosemide    Tablet 40milliGRAM(s) Oral daily  latanoprost 0.005% Ophthalmic Solution 1Drop(s) Both EYES at bedtime  levothyroxine 200MICROGram(s) Oral daily  Nephro-all 1Tablet(s) Oral daily  citalopram 10milliGRAM(s) Oral daily  simvastatin 40milliGRAM(s) Oral at bedtime  nystatin Powder 1Application(s) Topical three times a day  potassium chloride    Tablet ER 20milliEquivalent(s) Oral two times a day  artificial  tears Solution 1Drop(s) Both EYES two times a day  spironolactone 25milliGRAM(s) Oral daily  losartan 25milliGRAM(s) Oral daily  enoxaparin Injectable 40milliGRAM(s) SubCutaneous daily      Allergies: sulfamethoxazole (Other)      Vital Signs Last 24 Hrs  T(C): 36.8, Max: 36.8 (01-02 @ 21:25)  T(F): 98.2, Max: 98.2 (01-02 @ 21:25)  HR: 69 (58 - 74)  BP: 102/68 (102/68 - 137/59)  BP(mean): --  RR: 12 (12 - 20)  SpO2: 93% (93% - 96%)    PHYSICAL EXAM:  Constitutional: NAD,  elderly female  HEENT: PERRLA, EOMI, Normal Hearing, MMM  Neck: No LAD, No JVD  Back: Normal spine flexure, No CVA tenderness  Respiratory: CTAB Cardiovascular: S1 and S2, RRR, no M/G/R  Gastrointestinal: BS+, soft, NT/ND  Extremities: No peripheral edema  Vascular: 2+ peripheral pulses  Neurological: A/O x 3, no focal deficits  Psychiatric: Normal mood, normal affect  Musculoskeletal bedridden  Skin: No rashes        CAPILLARY BLOOD GLUCOSE    LABS:                        12.3   4.11  )-----------( 110      ( 03 Jan 2017 06:32 )             37.6     03 Jan 2017 06:33    141    |  102    |  32.0   ----------------------------<  90     4.8     |  27.0   |  1.22     Ca    9.2        03 Jan 2017 06:33  Phos  4.1       03 Jan 2017 06:33  Mg     2.4       03 Jan 2017 06:33            RADIOLOGY & ADDITIONAL TESTS:

## 2017-07-11 NOTE — PATIENT PROFILE ADULT. - CURRENT SWALLOWING
Addended by: ANDREA CISNEROS on: 7/11/2017 03:38 PM     Modules accepted: Orders    
(0) swallows foods and liquids w/o difficulty

## 2018-01-04 ENCOUNTER — INPATIENT (INPATIENT)
Facility: HOSPITAL | Age: 83
LOS: 3 days | Discharge: OTHER INSTITUTION | DRG: 202 | End: 2018-01-08
Attending: FAMILY MEDICINE | Admitting: HOSPITALIST
Payer: MEDICARE

## 2018-01-04 VITALS
HEART RATE: 77 BPM | SYSTOLIC BLOOD PRESSURE: 117 MMHG | RESPIRATION RATE: 22 BRPM | DIASTOLIC BLOOD PRESSURE: 61 MMHG | OXYGEN SATURATION: 91 % | HEIGHT: 61 IN | TEMPERATURE: 98 F | WEIGHT: 179.9 LBS

## 2018-01-04 LAB
ALBUMIN SERPL ELPH-MCNC: 3.8 G/DL — SIGNIFICANT CHANGE UP (ref 3.3–5.2)
ALP SERPL-CCNC: 95 U/L — SIGNIFICANT CHANGE UP (ref 40–120)
ALT FLD-CCNC: 9 U/L — SIGNIFICANT CHANGE UP
ANION GAP SERPL CALC-SCNC: 15 MMOL/L — SIGNIFICANT CHANGE UP (ref 5–17)
APTT BLD: 91.4 SEC — HIGH (ref 27.5–37.4)
AST SERPL-CCNC: 16 U/L — SIGNIFICANT CHANGE UP
BASOPHILS # BLD AUTO: 0 K/UL — SIGNIFICANT CHANGE UP (ref 0–0.2)
BASOPHILS NFR BLD AUTO: 0.1 % — SIGNIFICANT CHANGE UP (ref 0–2)
BILIRUB SERPL-MCNC: 0.3 MG/DL — LOW (ref 0.4–2)
BUN SERPL-MCNC: 37 MG/DL — HIGH (ref 8–20)
CALCIUM SERPL-MCNC: 9.1 MG/DL — SIGNIFICANT CHANGE UP (ref 8.6–10.2)
CHLORIDE SERPL-SCNC: 107 MMOL/L — SIGNIFICANT CHANGE UP (ref 98–107)
CK SERPL-CCNC: 37 U/L — SIGNIFICANT CHANGE UP (ref 25–170)
CO2 SERPL-SCNC: 16 MMOL/L — LOW (ref 22–29)
CREAT SERPL-MCNC: 1.56 MG/DL — HIGH (ref 0.5–1.3)
EOSINOPHIL # BLD AUTO: 0 K/UL — SIGNIFICANT CHANGE UP (ref 0–0.5)
EOSINOPHIL NFR BLD AUTO: 0.1 % — SIGNIFICANT CHANGE UP (ref 0–6)
GLUCOSE SERPL-MCNC: 87 MG/DL — SIGNIFICANT CHANGE UP (ref 70–115)
HCT VFR BLD CALC: 39.7 % — SIGNIFICANT CHANGE UP (ref 37–47)
HGB BLD-MCNC: 12.7 G/DL — SIGNIFICANT CHANGE UP (ref 12–16)
INR BLD: 1.73 RATIO — HIGH (ref 0.88–1.16)
LACTATE BLDV-MCNC: 1.4 MMOL/L — SIGNIFICANT CHANGE UP (ref 0.5–2)
LYMPHOCYTES # BLD AUTO: 1.5 K/UL — SIGNIFICANT CHANGE UP (ref 1–4.8)
LYMPHOCYTES # BLD AUTO: 18.7 % — LOW (ref 20–55)
MCHC RBC-ENTMCNC: 32 G/DL — SIGNIFICANT CHANGE UP (ref 32–36)
MCHC RBC-ENTMCNC: 32.6 PG — HIGH (ref 27–31)
MCV RBC AUTO: 101.8 FL — HIGH (ref 81–99)
MONOCYTES # BLD AUTO: 1.1 K/UL — HIGH (ref 0–0.8)
MONOCYTES NFR BLD AUTO: 14.4 % — HIGH (ref 3–10)
NEUTROPHILS # BLD AUTO: 5.2 K/UL — SIGNIFICANT CHANGE UP (ref 1.8–8)
NEUTROPHILS NFR BLD AUTO: 66.4 % — SIGNIFICANT CHANGE UP (ref 37–73)
NT-PROBNP SERPL-SCNC: 9401 PG/ML — HIGH (ref 0–300)
PLATELET # BLD AUTO: 125 K/UL — LOW (ref 150–400)
POTASSIUM SERPL-MCNC: 5.3 MMOL/L — SIGNIFICANT CHANGE UP (ref 3.5–5.3)
POTASSIUM SERPL-SCNC: 5.3 MMOL/L — SIGNIFICANT CHANGE UP (ref 3.5–5.3)
PROT SERPL-MCNC: 7.7 G/DL — SIGNIFICANT CHANGE UP (ref 6.6–8.7)
PROTHROM AB SERPL-ACNC: 19.3 SEC — HIGH (ref 9.8–12.7)
RBC # BLD: 3.9 M/UL — LOW (ref 4.4–5.2)
RBC # FLD: 13.8 % — SIGNIFICANT CHANGE UP (ref 11–15.6)
SODIUM SERPL-SCNC: 138 MMOL/L — SIGNIFICANT CHANGE UP (ref 135–145)
TROPONIN T SERPL-MCNC: <0.01 NG/ML — SIGNIFICANT CHANGE UP (ref 0–0.06)
WBC # BLD: 7.8 K/UL — SIGNIFICANT CHANGE UP (ref 4.8–10.8)
WBC # FLD AUTO: 7.8 K/UL — SIGNIFICANT CHANGE UP (ref 4.8–10.8)

## 2018-01-04 PROCEDURE — 71045 X-RAY EXAM CHEST 1 VIEW: CPT | Mod: 26

## 2018-01-04 PROCEDURE — 93010 ELECTROCARDIOGRAM REPORT: CPT

## 2018-01-04 PROCEDURE — 99285 EMERGENCY DEPT VISIT HI MDM: CPT

## 2018-01-04 RX ORDER — ALBUTEROL 90 UG/1
2.5 AEROSOL, METERED ORAL
Qty: 0 | Refills: 0 | Status: COMPLETED | OUTPATIENT
Start: 2018-01-04 | End: 2018-01-04

## 2018-01-04 RX ORDER — FUROSEMIDE 40 MG
40 TABLET ORAL ONCE
Qty: 0 | Refills: 0 | Status: COMPLETED | OUTPATIENT
Start: 2018-01-04 | End: 2018-01-04

## 2018-01-04 RX ORDER — ALBUTEROL 90 UG/1
2.5 AEROSOL, METERED ORAL ONCE
Qty: 0 | Refills: 0 | Status: COMPLETED | OUTPATIENT
Start: 2018-01-04 | End: 2018-01-04

## 2018-01-04 RX ORDER — IPRATROPIUM/ALBUTEROL SULFATE 18-103MCG
3 AEROSOL WITH ADAPTER (GRAM) INHALATION ONCE
Qty: 0 | Refills: 0 | Status: COMPLETED | OUTPATIENT
Start: 2018-01-04 | End: 2018-01-04

## 2018-01-04 RX ORDER — SODIUM CHLORIDE 9 MG/ML
3 INJECTION INTRAMUSCULAR; INTRAVENOUS; SUBCUTANEOUS ONCE
Qty: 0 | Refills: 0 | Status: COMPLETED | OUTPATIENT
Start: 2018-01-04 | End: 2018-01-04

## 2018-01-04 RX ADMIN — Medication 125 MILLIGRAM(S): at 23:17

## 2018-01-04 RX ADMIN — ALBUTEROL 2.5 MILLIGRAM(S): 90 AEROSOL, METERED ORAL at 20:33

## 2018-01-04 RX ADMIN — ALBUTEROL 2.5 MILLIGRAM(S): 90 AEROSOL, METERED ORAL at 23:16

## 2018-01-04 RX ADMIN — ALBUTEROL 2.5 MILLIGRAM(S): 90 AEROSOL, METERED ORAL at 23:46

## 2018-01-04 RX ADMIN — Medication 3 MILLILITER(S): at 20:33

## 2018-01-04 RX ADMIN — Medication 40 MILLIGRAM(S): at 23:17

## 2018-01-04 RX ADMIN — SODIUM CHLORIDE 3 MILLILITER(S): 9 INJECTION INTRAMUSCULAR; INTRAVENOUS; SUBCUTANEOUS at 19:56

## 2018-01-04 NOTE — ED PROVIDER NOTE - CONSTITUTIONAL, MLM
normal... Well appearing, well nourished, awake, alert, oriented to person, place, time/situation and in MINIMAL RESPIRATORY distress.

## 2018-01-04 NOTE — ED PROVIDER NOTE - OBJECTIVE STATEMENT
84 YEAR OLD FEMALE WITH COMPLAINT OF COUGH. PT STATES SHE WENT OUT [facility]  AND GOT A HEAD COLD. SINCE THAT TIME, AS PER PT, SHE HAS GOTTEN WORSE. SHE STATES PHLEGM HAS GONE INTO HER CHEST. SHE C/O COUGH. SHE SAYS OFTEN THE PHLEGM DOES NOT COME UP BUT WHEN IT DOES IT IS DARK. SHE SAYS SHE CANNOT SLEEP AT NIGHT BC OF THE COUGH AND WHEN SHE LIES FLAT IT IS MORE DIFFICULT TO BREATH.  SHE DENIES FEVER OR CHILLS. PCP IS DR SHI. NEVER A SMOKER.   MED HX:Adult Hypothyroidism (ICD9 244.9)    Afib    Benign Essential Hypertension (ICD9 401.1)    Chronic pain syndrome    Enterocolitis due to Clostridium difficile  from 8/5/2016  Glaucoma    History of Transient Ischemic Attack (TIA) (ICD9 V12.54)    Hyperlipidemia LDL Goal < 100 (ICD9 272.4)    Mitral Regurgitation (ICD9 424.0)    Polymyalgia rheumatica    Rhabdomyolysis

## 2018-01-04 NOTE — ED ADULT TRIAGE NOTE - CHIEF COMPLAINT QUOTE
Patient arrived via EMS, awake ,alert, and oriented times 3, breathing labored.  patient complaining of difficulty breathing for over a week.  productive cough with clear phlegm.  Oxygen applied after low O2 sat

## 2018-01-04 NOTE — ED PROVIDER NOTE - SHIFT CHANGE DETAILS
Elderly female with complicated medical history. Pt has severe cough. no fever but p ox 91 room air.  Started as a head cold xmas gianni. pneuomina vs chf vs uri/bronchitis/viral illness  f/u cxr , labs, ekg, dispo

## 2018-01-04 NOTE — ED ADULT NURSE NOTE - OBJECTIVE STATEMENT
Patient arrived via EMS, awake ,alert, and oriented times 3, breathing labored.  patient complaining of difficulty breathing for over a week.  productive cough with clear phlegm. BLE noted  Oxygen applied after low O2 sat

## 2018-01-04 NOTE — ED PROVIDER NOTE - MEDICAL DECISION MAKING DETAILS
PT WITH SOB. COMPLICATED MEDICAL HX. WHEEZING AND RHONCHI ON EXAM. WORK UP ORDERED. WILL FOLLOW PT WITH SOB. COMPLICATED MEDICAL HX. WHEEZING AND RHONCHI ON EXAM, elevated BNP and exam consistent with cardiac wheeze. pt requiring supplemental oxygen and to be admitted to hospital

## 2018-01-04 NOTE — ED PROVIDER NOTE - CARE PLAN
Principal Discharge DX:	Acute dyspnea Principal Discharge DX:	Acute on chronic congestive heart failure, unspecified congestive heart failure type

## 2018-01-04 NOTE — ED PROVIDER NOTE - MUSCULOSKELETAL, MLM
Spine appears normal, range of motion is not limited, no muscle or joint tenderness CHRONIC LYMPHEDEMA BLE

## 2018-01-05 DIAGNOSIS — I50.9 HEART FAILURE, UNSPECIFIED: ICD-10-CM

## 2018-01-05 DIAGNOSIS — Z95.2 PRESENCE OF PROSTHETIC HEART VALVE: Chronic | ICD-10-CM

## 2018-01-05 PROBLEM — H40.9 UNSPECIFIED GLAUCOMA: Chronic | Status: ACTIVE | Noted: 2017-01-01

## 2018-01-05 PROBLEM — M62.82 RHABDOMYOLYSIS: Chronic | Status: ACTIVE | Noted: 2017-01-01

## 2018-01-05 PROBLEM — G89.4 CHRONIC PAIN SYNDROME: Chronic | Status: ACTIVE | Noted: 2017-01-01

## 2018-01-05 PROBLEM — M35.3 POLYMYALGIA RHEUMATICA: Chronic | Status: ACTIVE | Noted: 2017-01-01

## 2018-01-05 PROBLEM — A04.7 ENTEROCOLITIS DUE TO CLOSTRIDIUM DIFFICILE: Chronic | Status: ACTIVE | Noted: 2017-01-01

## 2018-01-05 PROBLEM — I48.91 UNSPECIFIED ATRIAL FIBRILLATION: Chronic | Status: ACTIVE | Noted: 2017-01-01

## 2018-01-05 LAB
ACANTHOCYTES BLD QL SMEAR: SLIGHT — SIGNIFICANT CHANGE UP
ANION GAP SERPL CALC-SCNC: 15 MMOL/L — SIGNIFICANT CHANGE UP (ref 5–17)
ANISOCYTOSIS BLD QL: SLIGHT — SIGNIFICANT CHANGE UP
BUN SERPL-MCNC: 39 MG/DL — HIGH (ref 8–20)
CALCIUM SERPL-MCNC: 9 MG/DL — SIGNIFICANT CHANGE UP (ref 8.6–10.2)
CHLORIDE SERPL-SCNC: 106 MMOL/L — SIGNIFICANT CHANGE UP (ref 98–107)
CO2 SERPL-SCNC: 15 MMOL/L — LOW (ref 22–29)
CREAT SERPL-MCNC: 1.4 MG/DL — HIGH (ref 0.5–1.3)
ELLIPTOCYTES BLD QL SMEAR: SLIGHT — SIGNIFICANT CHANGE UP
GIANT PLATELETS BLD QL SMEAR: PRESENT — SIGNIFICANT CHANGE UP
GLUCOSE SERPL-MCNC: 142 MG/DL — HIGH (ref 70–115)
HCT VFR BLD CALC: 37.1 % — SIGNIFICANT CHANGE UP (ref 37–47)
HGB BLD-MCNC: 11.7 G/DL — LOW (ref 12–16)
LYMPHOCYTES # BLD AUTO: 0.3 K/UL — LOW (ref 1–4.8)
LYMPHOCYTES # BLD AUTO: 6 % — LOW (ref 20–55)
MACROCYTES BLD QL: SLIGHT — SIGNIFICANT CHANGE UP
MAGNESIUM SERPL-MCNC: 1.8 MG/DL — SIGNIFICANT CHANGE UP (ref 1.6–2.6)
MCHC RBC-ENTMCNC: 31.5 G/DL — LOW (ref 32–36)
MCHC RBC-ENTMCNC: 31.9 PG — HIGH (ref 27–31)
MCV RBC AUTO: 101.1 FL — HIGH (ref 81–99)
MONOCYTES # BLD AUTO: 0.1 K/UL — SIGNIFICANT CHANGE UP (ref 0–0.8)
MONOCYTES NFR BLD AUTO: 2 % — LOW (ref 3–10)
NEUTROPHILS # BLD AUTO: 5 K/UL — SIGNIFICANT CHANGE UP (ref 1.8–8)
NEUTROPHILS NFR BLD AUTO: 88 % — HIGH (ref 37–73)
NEUTS BAND # BLD: 4 % — SIGNIFICANT CHANGE UP (ref 0–8)
OVALOCYTES BLD QL SMEAR: SLIGHT — SIGNIFICANT CHANGE UP
PHOSPHATE SERPL-MCNC: 4.1 MG/DL — SIGNIFICANT CHANGE UP (ref 2.4–4.7)
PLAT MORPH BLD: NORMAL — SIGNIFICANT CHANGE UP
PLATELET # BLD AUTO: 99 K/UL — LOW (ref 150–400)
POIKILOCYTOSIS BLD QL AUTO: SIGNIFICANT CHANGE UP
POLYCHROMASIA BLD QL SMEAR: SLIGHT — SIGNIFICANT CHANGE UP
POTASSIUM SERPL-MCNC: 5.5 MMOL/L — HIGH (ref 3.5–5.3)
POTASSIUM SERPL-MCNC: 5.7 MMOL/L — HIGH (ref 3.5–5.3)
POTASSIUM SERPL-SCNC: 5.5 MMOL/L — HIGH (ref 3.5–5.3)
POTASSIUM SERPL-SCNC: 5.7 MMOL/L — HIGH (ref 3.5–5.3)
RAPID RVP RESULT: DETECTED
RBC # BLD: 3.67 M/UL — LOW (ref 4.4–5.2)
RBC # FLD: 14.3 % — SIGNIFICANT CHANGE UP (ref 11–15.6)
RBC BLD AUTO: ABNORMAL
RSV RNA SPEC QL NAA+PROBE: DETECTED
SCHISTOCYTES BLD QL AUTO: SLIGHT — SIGNIFICANT CHANGE UP
SODIUM SERPL-SCNC: 136 MMOL/L — SIGNIFICANT CHANGE UP (ref 135–145)
WBC # BLD: 5.4 K/UL — SIGNIFICANT CHANGE UP (ref 4.8–10.8)
WBC # FLD AUTO: 5.4 K/UL — SIGNIFICANT CHANGE UP (ref 4.8–10.8)

## 2018-01-05 PROCEDURE — 99223 1ST HOSP IP/OBS HIGH 75: CPT

## 2018-01-05 RX ORDER — LOSARTAN POTASSIUM 100 MG/1
25 TABLET, FILM COATED ORAL DAILY
Qty: 0 | Refills: 0 | Status: DISCONTINUED | OUTPATIENT
Start: 2018-01-05 | End: 2018-01-06

## 2018-01-05 RX ORDER — POTASSIUM CHLORIDE 20 MEQ
20 PACKET (EA) ORAL
Qty: 0 | Refills: 0 | Status: DISCONTINUED | OUTPATIENT
Start: 2018-01-05 | End: 2018-01-05

## 2018-01-05 RX ORDER — MIRTAZAPINE 45 MG/1
1 TABLET, ORALLY DISINTEGRATING ORAL
Qty: 0 | Refills: 0 | COMMUNITY

## 2018-01-05 RX ORDER — LEVOTHYROXINE SODIUM 125 MCG
175 TABLET ORAL DAILY
Qty: 0 | Refills: 0 | Status: DISCONTINUED | OUTPATIENT
Start: 2018-01-05 | End: 2018-01-08

## 2018-01-05 RX ORDER — NYSTATIN CREAM 100000 [USP'U]/G
1 CREAM TOPICAL
Qty: 0 | Refills: 0 | COMMUNITY

## 2018-01-05 RX ORDER — CHOLESTYRAMINE 4 G/9G
4 POWDER, FOR SUSPENSION ORAL DAILY
Qty: 0 | Refills: 0 | Status: DISCONTINUED | OUTPATIENT
Start: 2018-01-05 | End: 2018-01-08

## 2018-01-05 RX ORDER — LACTOBACILLUS ACIDOPHILUS 100MM CELL
1 CAPSULE ORAL
Qty: 0 | Refills: 0 | COMMUNITY

## 2018-01-05 RX ORDER — CITALOPRAM 10 MG/1
10 TABLET, FILM COATED ORAL DAILY
Qty: 0 | Refills: 0 | Status: DISCONTINUED | OUTPATIENT
Start: 2018-01-05 | End: 2018-01-08

## 2018-01-05 RX ORDER — CEFTRIAXONE 500 MG/1
1 INJECTION, POWDER, FOR SOLUTION INTRAMUSCULAR; INTRAVENOUS ONCE
Qty: 0 | Refills: 0 | Status: DISCONTINUED | OUTPATIENT
Start: 2018-01-05 | End: 2018-01-05

## 2018-01-05 RX ORDER — FUROSEMIDE 40 MG
40 TABLET ORAL EVERY 12 HOURS
Qty: 0 | Refills: 0 | Status: DISCONTINUED | OUTPATIENT
Start: 2018-01-05 | End: 2018-01-08

## 2018-01-05 RX ORDER — SIMVASTATIN 20 MG/1
40 TABLET, FILM COATED ORAL AT BEDTIME
Qty: 0 | Refills: 0 | Status: DISCONTINUED | OUTPATIENT
Start: 2018-01-05 | End: 2018-01-08

## 2018-01-05 RX ORDER — ACETAMINOPHEN 500 MG
650 TABLET ORAL EVERY 6 HOURS
Qty: 0 | Refills: 0 | Status: DISCONTINUED | OUTPATIENT
Start: 2018-01-05 | End: 2018-01-08

## 2018-01-05 RX ORDER — LEVOTHYROXINE SODIUM 125 MCG
1 TABLET ORAL
Qty: 0 | Refills: 0 | COMMUNITY

## 2018-01-05 RX ORDER — IPRATROPIUM/ALBUTEROL SULFATE 18-103MCG
3 AEROSOL WITH ADAPTER (GRAM) INHALATION EVERY 6 HOURS
Qty: 0 | Refills: 0 | Status: DISCONTINUED | OUTPATIENT
Start: 2018-01-05 | End: 2018-01-06

## 2018-01-05 RX ORDER — LATANOPROST 0.05 MG/ML
1 SOLUTION/ DROPS OPHTHALMIC; TOPICAL AT BEDTIME
Qty: 0 | Refills: 0 | Status: DISCONTINUED | OUTPATIENT
Start: 2018-01-05 | End: 2018-01-08

## 2018-01-05 RX ORDER — ALBUTEROL 90 UG/1
2.5 AEROSOL, METERED ORAL EVERY 4 HOURS
Qty: 0 | Refills: 0 | Status: DISCONTINUED | OUTPATIENT
Start: 2018-01-05 | End: 2018-01-08

## 2018-01-05 RX ORDER — SOD,AMMONIUM,POTASSIUM LACTATE
1 CREAM (GRAM) TOPICAL
Qty: 0 | Refills: 0 | Status: DISCONTINUED | OUTPATIENT
Start: 2018-01-05 | End: 2018-01-08

## 2018-01-05 RX ORDER — AZITHROMYCIN 500 MG/1
500 TABLET, FILM COATED ORAL ONCE
Qty: 0 | Refills: 0 | Status: COMPLETED | OUTPATIENT
Start: 2018-01-05 | End: 2018-01-05

## 2018-01-05 RX ORDER — CARVEDILOL PHOSPHATE 80 MG/1
3.12 CAPSULE, EXTENDED RELEASE ORAL EVERY 12 HOURS
Qty: 0 | Refills: 0 | Status: DISCONTINUED | OUTPATIENT
Start: 2018-01-05 | End: 2018-01-08

## 2018-01-05 RX ORDER — DABIGATRAN ETEXILATE MESYLATE 150 MG/1
75 CAPSULE ORAL EVERY 12 HOURS
Qty: 0 | Refills: 0 | Status: DISCONTINUED | OUTPATIENT
Start: 2018-01-05 | End: 2018-01-08

## 2018-01-05 RX ORDER — SIMVASTATIN 20 MG/1
1 TABLET, FILM COATED ORAL
Qty: 0 | Refills: 0 | COMMUNITY

## 2018-01-05 RX ORDER — SOD,AMMONIUM,POTASSIUM LACTATE
1 CREAM (GRAM) TOPICAL
Qty: 0 | Refills: 0 | COMMUNITY

## 2018-01-05 RX ORDER — FUROSEMIDE 40 MG
40 TABLET ORAL EVERY 12 HOURS
Qty: 0 | Refills: 0 | Status: DISCONTINUED | OUTPATIENT
Start: 2018-01-05 | End: 2018-01-05

## 2018-01-05 RX ORDER — DONEPEZIL HYDROCHLORIDE 10 MG/1
10 TABLET, FILM COATED ORAL AT BEDTIME
Qty: 0 | Refills: 0 | Status: DISCONTINUED | OUTPATIENT
Start: 2018-01-05 | End: 2018-01-08

## 2018-01-05 RX ORDER — AZITHROMYCIN 500 MG/1
500 TABLET, FILM COATED ORAL ONCE
Qty: 0 | Refills: 0 | Status: DISCONTINUED | OUTPATIENT
Start: 2018-01-05 | End: 2018-01-05

## 2018-01-05 RX ORDER — CHOLESTYRAMINE 4 G/9G
4 POWDER, FOR SUSPENSION ORAL
Qty: 0 | Refills: 0 | COMMUNITY

## 2018-01-05 RX ORDER — SACCHAROMYCES BOULARDII 250 MG
250 POWDER IN PACKET (EA) ORAL
Qty: 0 | Refills: 0 | Status: DISCONTINUED | OUTPATIENT
Start: 2018-01-05 | End: 2018-01-08

## 2018-01-05 RX ORDER — AZITHROMYCIN 500 MG/1
250 TABLET, FILM COATED ORAL DAILY
Qty: 0 | Refills: 0 | Status: DISCONTINUED | OUTPATIENT
Start: 2018-01-06 | End: 2018-01-06

## 2018-01-05 RX ADMIN — LATANOPROST 1 DROP(S): 0.05 SOLUTION/ DROPS OPHTHALMIC; TOPICAL at 23:03

## 2018-01-05 RX ADMIN — CARVEDILOL PHOSPHATE 3.12 MILLIGRAM(S): 80 CAPSULE, EXTENDED RELEASE ORAL at 17:50

## 2018-01-05 RX ADMIN — CITALOPRAM 10 MILLIGRAM(S): 10 TABLET, FILM COATED ORAL at 11:59

## 2018-01-05 RX ADMIN — Medication 250 MILLIGRAM(S): at 17:50

## 2018-01-05 RX ADMIN — Medication 3 MILLILITER(S): at 16:10

## 2018-01-05 RX ADMIN — Medication 100 MILLIGRAM(S): at 03:32

## 2018-01-05 RX ADMIN — Medication 40 MILLIGRAM(S): at 11:59

## 2018-01-05 RX ADMIN — Medication 100 MILLIGRAM(S): at 23:00

## 2018-01-05 RX ADMIN — SIMVASTATIN 40 MILLIGRAM(S): 20 TABLET, FILM COATED ORAL at 22:51

## 2018-01-05 RX ADMIN — Medication 250 MILLIGRAM(S): at 06:22

## 2018-01-05 RX ADMIN — AZITHROMYCIN 255 MILLIGRAM(S): 500 TABLET, FILM COATED ORAL at 01:47

## 2018-01-05 RX ADMIN — LOSARTAN POTASSIUM 25 MILLIGRAM(S): 100 TABLET, FILM COATED ORAL at 06:34

## 2018-01-05 RX ADMIN — Medication 1 TABLET(S): at 11:59

## 2018-01-05 RX ADMIN — ALBUTEROL 2.5 MILLIGRAM(S): 90 AEROSOL, METERED ORAL at 00:05

## 2018-01-05 RX ADMIN — Medication 3 MILLILITER(S): at 09:21

## 2018-01-05 RX ADMIN — Medication 20 MILLIEQUIVALENT(S): at 06:21

## 2018-01-05 RX ADMIN — Medication 650 MILLIGRAM(S): at 06:28

## 2018-01-05 RX ADMIN — Medication 175 MICROGRAM(S): at 06:22

## 2018-01-05 RX ADMIN — Medication 40 MILLIGRAM(S): at 23:00

## 2018-01-05 RX ADMIN — DABIGATRAN ETEXILATE MESYLATE 75 MILLIGRAM(S): 150 CAPSULE ORAL at 17:50

## 2018-01-05 RX ADMIN — DABIGATRAN ETEXILATE MESYLATE 75 MILLIGRAM(S): 150 CAPSULE ORAL at 06:21

## 2018-01-05 RX ADMIN — Medication 3 MILLILITER(S): at 03:33

## 2018-01-05 RX ADMIN — CARVEDILOL PHOSPHATE 3.12 MILLIGRAM(S): 80 CAPSULE, EXTENDED RELEASE ORAL at 06:21

## 2018-01-05 RX ADMIN — DONEPEZIL HYDROCHLORIDE 10 MILLIGRAM(S): 10 TABLET, FILM COATED ORAL at 22:51

## 2018-01-05 RX ADMIN — CHOLESTYRAMINE 4 GRAM(S): 4 POWDER, FOR SUSPENSION ORAL at 08:32

## 2018-01-05 NOTE — H&P ADULT - PSH
AICD (automatic cardioverter/defibrillator) present    History of Carotid Endarterectomy (ICD9 V45.89)    S/P cataract surgery  right eye  S/P Knee Replacement (ICD9 V43.65) AICD (automatic cardioverter/defibrillator) present    History of Carotid Endarterectomy (ICD9 V45.89)    S/P cataract surgery  right eye  S/P Knee Replacement (ICD9 V43.65)    S/P MVR (mitral valve replacement)  Bioprosthetic

## 2018-01-05 NOTE — H&P ADULT - PMH
Adult Hypothyroidism (ICD9 244.9)    Afib    Benign Essential Hypertension (ICD9 401.1)    Chronic pain syndrome    Enterocolitis due to Clostridium difficile  from 8/5/2016  Glaucoma    History of Transient Ischemic Attack (TIA) (ICD9 V12.54)    Hyperlipidemia LDL Goal < 100 (ICD9 272.4)    Mitral Regurgitation (ICD9 424.0)    Polymyalgia rheumatica    Rhabdomyolysis Adult Hypothyroidism (ICD9 244.9)    Afib    Aortic stenosis    Benign Essential Hypertension (ICD9 401.1)    Chronic pain syndrome    CKD (chronic kidney disease) stage 3, GFR 30-59 ml/min    Enterocolitis due to Clostridium difficile  from 8/5/2016  Glaucoma    History of Transient Ischemic Attack (TIA) (ICD9 V12.54)    Hyperlipidemia LDL Goal < 100 (ICD9 272.4)    Mitral Regurgitation (ICD9 424.0)    Polymyalgia rheumatica    Rhabdomyolysis    Thrombocytopenia    TR (tricuspid regurgitation)

## 2018-01-05 NOTE — PATIENT PROFILE ADULT. - NS TRANSFER PATIENT BELONGINGS
Jewelry/Money (specify)/Cell Phone/PDA (specify)/bracelets, rings Other belongings/bracelets, rings, earrings, book/Jewelry/Money (specify)

## 2018-01-05 NOTE — H&P ADULT - ASSESSMENT
84 years old female with PMH of CAD s/p stents, HFrEF, Severe AS & TR, A. Fib on Pradaxa, Bioprosthetic MVR, Carotid Stenosis s/p CEA, CKD, HTN, HLD, TIA, Hypothyroidism and Thrombocytopenia sent from [facility] with respiratory distress. As per patient, she has productive cough for last 8 days associated with difficulty in breathing. It started after she went out for dinner with her daughter on [address]. She has chronic leg edema, orthopnea and paroxysmal nocturnal dyspnea - denies any change in these symptoms. She has been taking her medications regularly. Denies fever, sick contacts or recent travel.   She has chronic diarrhea. Denies nausea, vomiting or abdominal pain. Denies any change in frequency of diarrhea.     1) Respiratory Distress  - Likely secondary to Bronchitis  - RVP and Sputum Culture  - Zithromax 500 mg x 1 then 250 mg x 4 days  - Nebs PRN  2) Systolic Heart Failure  - Strict intake/output  - Daily weight  - Lasix 40 mg q 12 hours  - Losartan 25 mg (will monitor renal function)  - Coreg 3.125 mg q 12 hours  - Hold Spironolactone due to renal insufficiency   3) CAD s/p stents, HTN and HLD  - Continue Simvastatin, Coreg and Losartan  4) Acute on chronic kidney injury  - Avoid nephrotoxic medications  - Monitor renal function  5) A. Fib  - Rate controlled  - Continue Coreg and Pradaxa  6) Hypothyroidism  - Continue Synthroid   7) Thrombocytopenia  - Chronic. No signs of bleeding  - Monitor Platelets  DVT Prophylaxis -- Patient is on Pradaxa.

## 2018-01-05 NOTE — H&P ADULT - NSHPSOCIALHISTORY_GEN_ALL_CORE
Resident of [facility] Resident of [facility].  .  Former smoker - quit more than 50 years ago.  Drinks alcohol socially.  No illicit drug use.

## 2018-01-05 NOTE — H&P ADULT - NSHPOUTPATIENTPROVIDERS_GEN_ALL_CORE
PMD: Dr. Romero and Cardio: Dr. Sutherland PMD: Dr. William Romero and Cardio: Dr. Sutherland PMD: Dr. William Romero and Cardio: [place]

## 2018-01-05 NOTE — H&P ADULT - NSHPLABSRESULTS_GEN_ALL_CORE
LABS:                        12.7   7.8   )-----------( 125      ( 04 Jan 2018 19:48 )             39.7     01-04    138  |  107  |  37.0<H>  ----------------------------<  87  5.3   |  16.0<L>  |  1.56<H>    Ca    9.1      04 Jan 2018 22:58    TPro  7.7  /  Alb  3.8  /  TBili  0.3<L>  /  DBili  x   /  AST  16  /  ALT  9   /  AlkPhos  95  01-04    PT/INR - ( 04 Jan 2018 22:58 )   PT: 19.3 sec;   INR: 1.73 ratio         PTT - ( 04 Jan 2018 22:58 )  PTT:91.4 sec  CARDIAC MARKERS ( 04 Jan 2018 21:09 )  x     / <0.01 ng/mL / 37 U/L / x     / x            EKG: Paced rhythm at 70 bpm.

## 2018-01-05 NOTE — PROGRESS NOTE ADULT - SUBJECTIVE AND OBJECTIVE BOX
HPI:  84 years old female with PMH of CAD s/p stents, HFrEF, Severe AS & TR, A. Fib on Pradaxa, Bioprosthetic MVR, Carotid Stenosis s/p CEA, CKD, HTN, HLD, TIA, Hypothyroidism and Thrombocytopenia sent from [facility] with respiratory distress. As per patient, she has productive cough for last 8 days associated with difficulty in breathing. It started after she went out for dinner with her daughter on [place] [address]. She has chronic leg edema, orthopnea and paroxysmal nocturnal dyspnea - denies any change in these symptoms. She has been taking her medications regularly. Denies fever, sick contacts or recent travel.   She has chronic diarrhea. Denies nausea, vomiting or abdominal pain. Denies any change in frequency of diarrhea. (05 Jan 2018 01:41)     Allergies    sulfamethoxazole (Other)    Intolerances      Thrombocytopenia  CKD (chronic kidney disease) stage 3, GFR 30-59 ml/min  TR (tricuspid regurgitation)  Aortic stenosis  Polymyalgia rheumatica  Afib  Glaucoma  Enterocolitis due to Clostridium difficile  Chronic pain syndrome  Rhabdomyolysis  Benign Essential Hypertension (ICD9 401.1)  History of Transient Ischemic Attack (TIA) (ICD9 V12.54)  Adult Hypothyroidism (ICD9 244.9)  Mitral Regurgitation (ICD9 424.0)  Hyperlipidemia LDL Goal < 100 (ICD9 272.4)    MEDICATIONS  (STANDING):  ALBUTerol/ipratropium for Nebulization 3 milliLiter(s) Nebulizer every 6 hours  ammonium lactate 12% Lotion 1 Application(s) Topical two times a day  carvedilol 3.125 milliGRAM(s) Oral every 12 hours  cholestyramine Powder (Sugar-Free) 4 Gram(s) Oral daily  citalopram 10 milliGRAM(s) Oral daily  dabigatran 75 milliGRAM(s) Oral every 12 hours  donepezil 10 milliGRAM(s) Oral at bedtime  furosemide    Tablet 40 milliGRAM(s) Oral every 12 hours  latanoprost 0.005% Ophthalmic Solution 1 Drop(s) Both EYES at bedtime  levothyroxine 175 MICROGram(s) Oral daily  losartan 25 milliGRAM(s) Oral daily  multivitamin 1 Tablet(s) Oral daily  saccharomyces boulardii 250 milliGRAM(s) Oral two times a day  simvastatin 40 milliGRAM(s) Oral at bedtime    MEDICATIONS  (PRN):  acetaminophen   Tablet 650 milliGRAM(s) Oral every 6 hours PRN For Temp greater than 38 C (100.4 F)  acetaminophen   Tablet. 650 milliGRAM(s) Oral every 6 hours PRN Headache or Bodyache  ALBUTerol    0.083% 2.5 milliGRAM(s) Nebulizer every 4 hours PRN Shortness of Breath and/or Wheezing  artificial  tears Solution 1 Drop(s) Both EYES three times a day PRN Dry Eyes  guaiFENesin   Syrup  (Sugar-Free) 100 milliGRAM(s) Oral every 6 hours PRN Cough                           11.7   5.4   )-----------( 99       ( 05 Jan 2018 08:26 )             37.1     01-05    x   |  x   |  x   ----------------------------<  x   5.5<H>   |  x   |  x     Ca    9.0      05 Jan 2018 08:26  Phos  4.1     01-05  Mg     1.8     01-05    TPro  7.7  /  Alb  3.8  /  TBili  0.3<L>  /  DBili  x   /  AST  16  /  ALT  9   /  AlkPhos  95  01-04    PT/INR - ( 04 Jan 2018 22:58 )   PT: 19.3 sec;   INR: 1.73 ratio         PTT - ( 04 Jan 2018 22:58 )  PTT:91.4 sec  ;  Vital Signs Last 24 Hrs  T(C): 36.8 (05 Jan 2018 19:34), Max: 36.9 (05 Jan 2018 16:39)  T(F): 98.2 (05 Jan 2018 19:34), Max: 98.4 (05 Jan 2018 16:39)  HR: 67 (05 Jan 2018 19:34) (60 - 80)  BP: 113/56 (05 Jan 2018 19:34) (113/56 - 149/66)  BP(mean): --  RR: 18 (05 Jan 2018 19:34) (18 - 22)  SpO2: 95% (05 Jan 2018 19:34) (95% - 99%)      Patient feeling better No CP, No SOB, No N/V decrease cough  HEENT: PEARLA  Neck: Supple  Cardio: S1 S2 SE Murmur  Pulm: Exp Ronchi Rt > Lt  Abd: Soft NT ND BS+  Ext: No DCT +2 edema  Neuro: Awake Pleasant        RSV Bronchitis - Nebs, Zithromax  Afib - Rate control  Glaucoma - drops  Hypertension- cont meds   CHF - Lasix, coreg  TIA - Pradaxa  Renal Insufficiency - cont to follow creatine  Hypothyroidism - synthroid  Hyperlipidemia - cont statin  Dementia mild - Aricept  Depression - Citalopram  HyperK - DC Postassium improving

## 2018-01-05 NOTE — H&P ADULT - NSHPPHYSICALEXAM_GEN_ALL_CORE
Vital Signs   T(C): 36.7 (05 Jan 2018 01:45), Max: 36.7 (05 Jan 2018 01:45)  T(F): 98.1 (05 Jan 2018 01:45), Max: 98.1 (05 Jan 2018 01:45)  HR: 80 (05 Jan 2018 01:45) (76 - 80)  BP: 123/59 (05 Jan 2018 01:45) (117/61 - 123/59)  RR: 20 (05 Jan 2018 01:45) (20 - 22)  SpO2: 98% (05 Jan 2018 01:45) (91% - 98%)  General: Well developed. Well nourished. Mild respiratory distress.  HEENT: PERRLA. EOMI. Clear conjunctivae. Moist mucus membrane  Neck: Supple. No JVD.   Chest: Good air entry bilaterally. Coarse breath sounds. Crackles at bases. No wheezing or rhonchi.   Heart: Normal S1 & S2 with RRR. 2/6 systolic murmur.  Abdomen: Soft. Non-tender. Non-distended. + BS  Ext: Chronic lymphedema bilaterally. No calf tenderness   Neuro: AAO x 3, No focal deficit, CN II-XII grossly WNL and no speech disorder  Skin: Warm and Dry  Psychiatry: Normal mood and affect

## 2018-01-06 PROCEDURE — 71045 X-RAY EXAM CHEST 1 VIEW: CPT | Mod: 26

## 2018-01-06 RX ORDER — IPRATROPIUM/ALBUTEROL SULFATE 18-103MCG
3 AEROSOL WITH ADAPTER (GRAM) INHALATION EVERY 8 HOURS
Qty: 0 | Refills: 0 | Status: DISCONTINUED | OUTPATIENT
Start: 2018-01-06 | End: 2018-01-08

## 2018-01-06 RX ORDER — LOSARTAN POTASSIUM 100 MG/1
25 TABLET, FILM COATED ORAL DAILY
Qty: 0 | Refills: 0 | Status: DISCONTINUED | OUTPATIENT
Start: 2018-01-06 | End: 2018-01-08

## 2018-01-06 RX ADMIN — CITALOPRAM 10 MILLIGRAM(S): 10 TABLET, FILM COATED ORAL at 14:17

## 2018-01-06 RX ADMIN — Medication 40 MILLIGRAM(S): at 10:09

## 2018-01-06 RX ADMIN — CHOLESTYRAMINE 4 GRAM(S): 4 POWDER, FOR SUSPENSION ORAL at 10:09

## 2018-01-06 RX ADMIN — CARVEDILOL PHOSPHATE 3.12 MILLIGRAM(S): 80 CAPSULE, EXTENDED RELEASE ORAL at 18:09

## 2018-01-06 RX ADMIN — LATANOPROST 1 DROP(S): 0.05 SOLUTION/ DROPS OPHTHALMIC; TOPICAL at 23:20

## 2018-01-06 RX ADMIN — Medication 100 MILLIGRAM(S): at 06:41

## 2018-01-06 RX ADMIN — DABIGATRAN ETEXILATE MESYLATE 75 MILLIGRAM(S): 150 CAPSULE ORAL at 18:10

## 2018-01-06 RX ADMIN — Medication 250 MILLIGRAM(S): at 18:09

## 2018-01-06 RX ADMIN — DABIGATRAN ETEXILATE MESYLATE 75 MILLIGRAM(S): 150 CAPSULE ORAL at 06:41

## 2018-01-06 RX ADMIN — AZITHROMYCIN 250 MILLIGRAM(S): 500 TABLET, FILM COATED ORAL at 14:17

## 2018-01-06 RX ADMIN — Medication 3 MILLILITER(S): at 15:47

## 2018-01-06 RX ADMIN — SIMVASTATIN 40 MILLIGRAM(S): 20 TABLET, FILM COATED ORAL at 23:19

## 2018-01-06 RX ADMIN — LOSARTAN POTASSIUM 25 MILLIGRAM(S): 100 TABLET, FILM COATED ORAL at 06:41

## 2018-01-06 RX ADMIN — DONEPEZIL HYDROCHLORIDE 10 MILLIGRAM(S): 10 TABLET, FILM COATED ORAL at 23:19

## 2018-01-06 RX ADMIN — CARVEDILOL PHOSPHATE 3.12 MILLIGRAM(S): 80 CAPSULE, EXTENDED RELEASE ORAL at 06:42

## 2018-01-06 RX ADMIN — Medication 3 MILLILITER(S): at 03:43

## 2018-01-06 RX ADMIN — Medication 175 MICROGRAM(S): at 06:42

## 2018-01-06 RX ADMIN — Medication 250 MILLIGRAM(S): at 06:41

## 2018-01-06 NOTE — ED ADULT NURSE REASSESSMENT NOTE - NS ED NURSE REASSESS COMMENT FT1
received from report from emilee hernandez. pateint received awake alert and oriented x3. resp even and unlabored. lungs with exp wheeze. nsr on cardiac monitor. moves all extremities. #22 removed from right wrist due to swelling. no other complaints offered. awaiting bed.
Admitting physician at bedside, POC discussed with pt who verbalize understanding and reasoning for admission and agrees with POC, awaiting further orders at this time, pt safety and comfort measures maintained.
Lab called with RSV+ test results, findings reported to admitting physician.
Pt A&Ox4 offers no complaint at this time. Pt resting comfortably, VSS, no signs of distress at this time, CM in place V. paced, underlying A.fib, awaiting bed, safety maintained, call bell in reach.

## 2018-01-06 NOTE — PROGRESS NOTE ADULT - SUBJECTIVE AND OBJECTIVE BOX
HPI:  84 years old female with PMH of CAD s/p stents, HFrEF, Severe AS & TR, A. Fib on Pradaxa, Bioprosthetic MVR, Carotid Stenosis s/p CEA, CKD, HTN, HLD, TIA, Hypothyroidism and Thrombocytopenia sent from [facility] with respiratory distress. As per patient, she has productive cough for last 8 days associated with difficulty in breathing. It started after she went out for dinner with her daughter on [place] [address]. She has chronic leg edema, orthopnea and paroxysmal nocturnal dyspnea - denies any change in these symptoms. She has been taking her medications regularly. Denies fever, sick contacts or recent travel.   She has chronic diarrhea. Denies nausea, vomiting or abdominal pain. Denies any change in frequency of diarrhea. (05 Jan 2018 01:41)     Allergies    sulfamethoxazole (Other)    Intolerances      Thrombocytopenia  CKD (chronic kidney disease) stage 3, GFR 30-59 ml/min  TR (tricuspid regurgitation)  Aortic stenosis  Polymyalgia rheumatica  Afib  Glaucoma  Enterocolitis due to Clostridium difficile  Chronic pain syndrome  Rhabdomyolysis  Benign Essential Hypertension (ICD9 401.1)  History of Transient Ischemic Attack (TIA) (ICD9 V12.54)  Adult Hypothyroidism (ICD9 244.9)  Mitral Regurgitation (ICD9 424.0)  Hyperlipidemia LDL Goal < 100 (ICD9 272.4)    MEDICATIONS  (STANDING):  ALBUTerol/ipratropium for Nebulization 3 milliLiter(s) Nebulizer every 6 hours  ammonium lactate 12% Lotion 1 Application(s) Topical two times a day  azithromycin   Tablet 250 milliGRAM(s) Oral daily  carvedilol 3.125 milliGRAM(s) Oral every 12 hours  cholestyramine Powder (Sugar-Free) 4 Gram(s) Oral daily  citalopram 10 milliGRAM(s) Oral daily  dabigatran 75 milliGRAM(s) Oral every 12 hours  donepezil 10 milliGRAM(s) Oral at bedtime  furosemide    Tablet 40 milliGRAM(s) Oral every 12 hours  latanoprost 0.005% Ophthalmic Solution 1 Drop(s) Both EYES at bedtime  levothyroxine 175 MICROGram(s) Oral daily  losartan 25 milliGRAM(s) Oral daily  multivitamin 1 Tablet(s) Oral daily  saccharomyces boulardii 250 milliGRAM(s) Oral two times a day  simvastatin 40 milliGRAM(s) Oral at bedtime    MEDICATIONS  (PRN):  acetaminophen   Tablet 650 milliGRAM(s) Oral every 6 hours PRN For Temp greater than 38 C (100.4 F)  acetaminophen   Tablet. 650 milliGRAM(s) Oral every 6 hours PRN Headache or Bodyache  ALBUTerol    0.083% 2.5 milliGRAM(s) Nebulizer every 4 hours PRN Shortness of Breath and/or Wheezing  artificial  tears Solution 1 Drop(s) Both EYES three times a day PRN Dry Eyes  guaiFENesin   Syrup  (Sugar-Free) 100 milliGRAM(s) Oral every 6 hours PRN Cough                           11.7   5.4   )-----------( 99       ( 05 Jan 2018 08:26 )             37.1     01-05    x   |  x   |  x   ----------------------------<  x   5.5<H>   |  x   |  x     Ca    9.0      05 Jan 2018 08:26  Phos  4.1     01-05  Mg     1.8     01-05    TPro  7.7  /  Alb  3.8  /  TBili  0.3<L>  /  DBili  x   /  AST  16  /  ALT  9   /  AlkPhos  95  01-04    PT/INR - ( 04 Jan 2018 22:58 )   PT: 19.3 sec;   INR: 1.73 ratio         PTT - ( 04 Jan 2018 22:58 )  PTT:91.4 sec  ;  Vital Signs Last 24 Hrs  T(C): 37 (06 Jan 2018 14:19), Max: 37 (06 Jan 2018 14:19)  T(F): 98.6 (06 Jan 2018 14:19), Max: 98.6 (06 Jan 2018 14:19)  HR: 75 (06 Jan 2018 14:19) (58 - 75)  BP: 113/67 (06 Jan 2018 14:19) (110/58 - 127/58)  BP(mean): --  RR: 18 (06 Jan 2018 14:19) (16 - 28)  SpO2: 94% (06 Jan 2018 14:19) (94% - 96%)      Patient feeling better No CP, No SOB, No N/V decrease cough  HEENT: PEARLA  Neck: Supple  Cardio: S1 S2 SE Murmur  Pulm: Exp Ronchi Rt > Lt  Abd: Soft NT ND BS+  Ext: No DCT +2 edema  Neuro: Awake Pleasant        RSV Bronchitis - Nebs, Zithromax  Afib - Rate control  Glaucoma - drops  Hypertension- cont meds   CHF - Lasix, coreg  TIA - Pradaxa  Renal Insufficiency - cont to follow creatine  Hypothyroidism - synthroid  Hyperlipidemia - cont statin  Dementia mild - Aricept  Depression - Citalopram  HyperK - DC Postassium improving  Stable for D/C back to NH

## 2018-01-07 ENCOUNTER — TRANSCRIPTION ENCOUNTER (OUTPATIENT)
Age: 83
End: 2018-01-07

## 2018-01-07 LAB
ANION GAP SERPL CALC-SCNC: 14 MMOL/L — SIGNIFICANT CHANGE UP (ref 5–17)
BUN SERPL-MCNC: 38 MG/DL — HIGH (ref 8–20)
CALCIUM SERPL-MCNC: 9 MG/DL — SIGNIFICANT CHANGE UP (ref 8.6–10.2)
CHLORIDE SERPL-SCNC: 107 MMOL/L — SIGNIFICANT CHANGE UP (ref 98–107)
CO2 SERPL-SCNC: 22 MMOL/L — SIGNIFICANT CHANGE UP (ref 22–29)
CREAT SERPL-MCNC: 1.23 MG/DL — SIGNIFICANT CHANGE UP (ref 0.5–1.3)
GLUCOSE SERPL-MCNC: 77 MG/DL — SIGNIFICANT CHANGE UP (ref 70–115)
GRAM STN FLD: SIGNIFICANT CHANGE UP
POTASSIUM SERPL-MCNC: 4.6 MMOL/L — SIGNIFICANT CHANGE UP (ref 3.5–5.3)
POTASSIUM SERPL-SCNC: 4.6 MMOL/L — SIGNIFICANT CHANGE UP (ref 3.5–5.3)
SODIUM SERPL-SCNC: 143 MMOL/L — SIGNIFICANT CHANGE UP (ref 135–145)
SPECIMEN SOURCE: SIGNIFICANT CHANGE UP

## 2018-01-07 RX ORDER — ACETAMINOPHEN 500 MG
2 TABLET ORAL
Qty: 0 | Refills: 0 | COMMUNITY
Start: 2018-01-07

## 2018-01-07 RX ORDER — GUAIFENESIN/DEXTROMETHORPHAN 600MG-30MG
10 TABLET, EXTENDED RELEASE 12 HR ORAL
Qty: 0 | Refills: 0 | COMMUNITY

## 2018-01-07 RX ORDER — SACCHAROMYCES BOULARDII 250 MG
1 POWDER IN PACKET (EA) ORAL
Qty: 0 | Refills: 0 | COMMUNITY
Start: 2018-01-07

## 2018-01-07 RX ORDER — ALBUTEROL 90 UG/1
3 AEROSOL, METERED ORAL
Qty: 30 | Refills: 0 | OUTPATIENT
Start: 2018-01-07

## 2018-01-07 RX ORDER — IPRATROPIUM/ALBUTEROL SULFATE 18-103MCG
3 AEROSOL WITH ADAPTER (GRAM) INHALATION
Qty: 0 | Refills: 0 | COMMUNITY
Start: 2018-01-07

## 2018-01-07 RX ORDER — LOSARTAN POTASSIUM 100 MG/1
1 TABLET, FILM COATED ORAL
Qty: 0 | Refills: 0 | COMMUNITY
Start: 2018-01-07

## 2018-01-07 RX ORDER — AZITHROMYCIN 500 MG/1
1 TABLET, FILM COATED ORAL
Qty: 0 | Refills: 0 | COMMUNITY

## 2018-01-07 RX ORDER — ACETAMINOPHEN 500 MG
2 TABLET ORAL
Qty: 0 | Refills: 0 | COMMUNITY

## 2018-01-07 RX ADMIN — CARVEDILOL PHOSPHATE 3.12 MILLIGRAM(S): 80 CAPSULE, EXTENDED RELEASE ORAL at 17:25

## 2018-01-07 RX ADMIN — Medication 3 MILLILITER(S): at 08:42

## 2018-01-07 RX ADMIN — SIMVASTATIN 40 MILLIGRAM(S): 20 TABLET, FILM COATED ORAL at 21:05

## 2018-01-07 RX ADMIN — Medication 175 MICROGRAM(S): at 06:15

## 2018-01-07 RX ADMIN — CITALOPRAM 10 MILLIGRAM(S): 10 TABLET, FILM COATED ORAL at 11:42

## 2018-01-07 RX ADMIN — Medication 100 MILLIGRAM(S): at 11:42

## 2018-01-07 RX ADMIN — DONEPEZIL HYDROCHLORIDE 10 MILLIGRAM(S): 10 TABLET, FILM COATED ORAL at 21:05

## 2018-01-07 RX ADMIN — Medication 250 MILLIGRAM(S): at 06:15

## 2018-01-07 RX ADMIN — Medication 3 MILLILITER(S): at 00:47

## 2018-01-07 RX ADMIN — DABIGATRAN ETEXILATE MESYLATE 75 MILLIGRAM(S): 150 CAPSULE ORAL at 17:25

## 2018-01-07 RX ADMIN — DABIGATRAN ETEXILATE MESYLATE 75 MILLIGRAM(S): 150 CAPSULE ORAL at 06:15

## 2018-01-07 RX ADMIN — CHOLESTYRAMINE 4 GRAM(S): 4 POWDER, FOR SUSPENSION ORAL at 09:35

## 2018-01-07 RX ADMIN — Medication 100 MILLIGRAM(S): at 06:17

## 2018-01-07 RX ADMIN — Medication 3 MILLILITER(S): at 23:56

## 2018-01-07 RX ADMIN — Medication 1 APPLICATION(S): at 09:36

## 2018-01-07 RX ADMIN — CARVEDILOL PHOSPHATE 3.12 MILLIGRAM(S): 80 CAPSULE, EXTENDED RELEASE ORAL at 06:15

## 2018-01-07 RX ADMIN — Medication 250 MILLIGRAM(S): at 17:25

## 2018-01-07 RX ADMIN — Medication 3 MILLILITER(S): at 15:23

## 2018-01-07 RX ADMIN — Medication 1 TABLET(S): at 11:42

## 2018-01-07 RX ADMIN — LATANOPROST 1 DROP(S): 0.05 SOLUTION/ DROPS OPHTHALMIC; TOPICAL at 21:05

## 2018-01-07 NOTE — DISCHARGE NOTE ADULT - PLAN OF CARE
Cont Nebs and Cough suppressents Nursing home Rate control Control Avoid NSAIDs Follow Potassium level

## 2018-01-07 NOTE — DISCHARGE NOTE ADULT - MEDICATION SUMMARY - MEDICATIONS TO TAKE
I will START or STAY ON the medications listed below when I get home from the hospital:    spironolactone 25 mg oral tablet  -- 1 tab(s) by mouth once a day  -- Indication: For CHF    acetaminophen 325 mg oral tablet  -- 2 tab(s) by mouth every 6 hours, As needed, For Temp greater than 38 C (100.4 F)  -- Indication: For Pain    losartan 25 mg oral tablet  -- 1 tab(s) by mouth once a day  -- Indication: For HTN    dabigatran 75 mg oral capsule  -- 1 cap(s) by mouth 2 times a day  -- Indication: For Afib    citalopram 10 mg oral tablet  -- 1 tab(s) by mouth once a day  -- Indication: For Depression    simvastatin 40 mg oral tablet  -- 1 tab(s) by mouth once a day (at bedtime)  -- Indication: For Cholesterol    cholestyramine 4 g/5 g oral powder for reconstitution  -- 4 gram(s) by mouth once a day  -- Indication: For Diarrhea    carvedilol 3.125 mg oral tablet  -- 1 tab(s) by mouth 2 times a day  -- Indication: For HTN    ipratropium-albuterol 0.5 mg-2.5 mg/3 mLinhalation solution  -- 3 milliliter(s) inhaled every 8 hours  -- Indication: For Bronchitis    donepezil 10 mg oral tablet  -- 1 tab(s) by mouth once a day (at bedtime)  -- Indication: For Demetia    menthol 5% topical pad  -- Apply on skin to affected area once a day (at bedtime). take off in morning  -- Indication: For Skin    Lac-Hydrin 5 topical lotion  -- Apply on skin to affected area 2 times a day  -- Indication: For Skin    furosemide 40 mg oral tablet  -- 1 tab(s) by mouth 2 times a day  -- Indication: For CHF    guaiFENesin 100 mg/5 mL oral liquid  -- 5 milliliter(s) by mouth every 6 hours, As needed, Cough  -- Indication: For Cough    potassium chloride 20 mEq oral tablet, extended release  -- 1 tab(s) by mouth 2 times a day  -- Indication: For Suppliment    latanoprost 0.005% ophthalmic solution  -- 1 drop(s) to each affected eye once a day (in the evening)  -- Indication: For Eye    Lubricant Eye Drops  -- 1 drop(s) to each affected eye 2 times a day  -- Indication: For Eye    saccharomyces boulardii lyo 250 mg oral capsule  -- 1 cap(s) by mouth 2 times a day  -- Indication: For C Diff Prevention    levothyroxine 175 mcg (0.175 mg) oral tablet  -- 1 tab(s) by mouth once a day  -- Indication: For Thyriod    multivitamin  -- 1 tab(s) by mouth once a day  -- Indication: For Supplement

## 2018-01-07 NOTE — DISCHARGE NOTE ADULT - PATIENT PORTAL LINK FT
“You can access the Follow[facility] Patient Portal, offered by [facility], by registering with the following website: [email or web address]”

## 2018-01-07 NOTE — DISCHARGE NOTE ADULT - CARE PROVIDER_API CALL
William Romero (DO), Family Medicine  [address]  [place] [address]  Phone: [phone]  Fax: [phone]

## 2018-01-07 NOTE — DISCHARGE NOTE ADULT - HOSPITAL COURSE
83 yo with progressive cough and SOB at NH admitted with RSV Bronchitis that responded to Steroids and  Nebs. Pt Afib and CHF remained controlled. Pt did have hyperkalemia and supplemental Potassium was stopped.  Pt Has renal insufficiency so Pradaxa dose must be watched carefully

## 2018-01-07 NOTE — DISCHARGE NOTE ADULT - CARE PLAN
Principal Discharge DX:	RSV bronchitis  Goal:	Cont Nebs and Cough suppressents  Instructions for follow-up, activity and diet:	Nursing home  Secondary Diagnosis:	Afib  Goal:	Rate control  Instructions for follow-up, activity and diet:	Nursing home  Secondary Diagnosis:	Acute on chronic congestive heart failure, unspecified congestive heart failure type  Goal:	Control  Instructions for follow-up, activity and diet:	Nursing home  Secondary Diagnosis:	CKD (chronic kidney disease) stage 3, GFR 30-59 ml/min  Goal:	Avoid NSAIDs  Instructions for follow-up, activity and diet:	Follow Potassium level

## 2018-01-07 NOTE — PROGRESS NOTE ADULT - SUBJECTIVE AND OBJECTIVE BOX
HPI:  84 years old female with PMH of CAD s/p stents, HFrEF, Severe AS & TR, A. Fib on Pradaxa, Bioprosthetic MVR, Carotid Stenosis s/p CEA, CKD, HTN, HLD, TIA, Hypothyroidism and Thrombocytopenia sent from [facility] with respiratory distress. As per patient, she has productive cough for last 8 days associated with difficulty in breathing. It started after she went out for dinner with her daughter on [place] [address]. She has chronic leg edema, orthopnea and paroxysmal nocturnal dyspnea - denies any change in these symptoms. She has been taking her medications regularly. Denies fever, sick contacts or recent travel.   She has chronic diarrhea. Denies nausea, vomiting or abdominal pain. Denies any change in frequency of diarrhea. (05 Jan 2018 01:41)     Allergies    sulfamethoxazole (Other)    Intolerances      Thrombocytopenia  CKD (chronic kidney disease) stage 3, GFR 30-59 ml/min  TR (tricuspid regurgitation)  Aortic stenosis  Polymyalgia rheumatica  Afib  Glaucoma  Enterocolitis due to Clostridium difficile  Chronic pain syndrome  Rhabdomyolysis  Benign Essential Hypertension (ICD9 401.1)  History of Transient Ischemic Attack (TIA) (ICD9 V12.54)  Adult Hypothyroidism (ICD9 244.9)  Mitral Regurgitation (ICD9 424.0)  Hyperlipidemia LDL Goal < 100 (ICD9 272.4)    MEDICATIONS  (STANDING):  ALBUTerol/ipratropium for Nebulization 3 milliLiter(s) Nebulizer every 8 hours  ammonium lactate 12% Lotion 1 Application(s) Topical two times a day  carvedilol 3.125 milliGRAM(s) Oral every 12 hours  cholestyramine Powder (Sugar-Free) 4 Gram(s) Oral daily  citalopram 10 milliGRAM(s) Oral daily  dabigatran 75 milliGRAM(s) Oral every 12 hours  donepezil 10 milliGRAM(s) Oral at bedtime  furosemide    Tablet 40 milliGRAM(s) Oral every 12 hours  latanoprost 0.005% Ophthalmic Solution 1 Drop(s) Both EYES at bedtime  levothyroxine 175 MICROGram(s) Oral daily  losartan 25 milliGRAM(s) Oral daily  multivitamin 1 Tablet(s) Oral daily  saccharomyces boulardii 250 milliGRAM(s) Oral two times a day  simvastatin 40 milliGRAM(s) Oral at bedtime    MEDICATIONS  (PRN):  acetaminophen   Tablet 650 milliGRAM(s) Oral every 6 hours PRN For Temp greater than 38 C (100.4 F)  acetaminophen   Tablet. 650 milliGRAM(s) Oral every 6 hours PRN Headache or Bodyache  ALBUTerol    0.083% 2.5 milliGRAM(s) Nebulizer every 4 hours PRN Shortness of Breath and/or Wheezing  artificial  tears Solution 1 Drop(s) Both EYES three times a day PRN Dry Eyes  guaiFENesin   Syrup  (Sugar-Free) 100 milliGRAM(s) Oral every 6 hours PRN Cough       01-07    143  |  107  |  38.0<H>  ----------------------------<  77  4.6   |  22.0  |  1.23    Ca    9.0      07 Jan 2018 07:31        ;  Vital Signs Last 24 Hrs  T(C): 36.8 (07 Jan 2018 17:14), Max: 36.9 (07 Jan 2018 06:10)  T(F): 98.2 (07 Jan 2018 17:14), Max: 98.4 (07 Jan 2018 06:10)  HR: 68 (07 Jan 2018 17:14) (66 - 76)  BP: 111/55 (07 Jan 2018 17:14) (90/60 - 126/68)  BP(mean): --  RR: 20 (07 Jan 2018 17:14) (20 - 26)  SpO2: 94% (07 Jan 2018 17:14) (90% - 98%)  CAPILLARY BLOOD GLUCOSE      01-07 @ 07:01  -  01-07 @ 19:22  --------------------------------------------------------  IN: 360 mL / OUT: 0 mL / NET: 360 mL      Patient feeling better No CP, No SOB, No N/V minimal cough  HEENT: PEARLA  Neck: Supple  Cardio: S1 S2 SE Murmur  Pulm: CTA  Abd: Soft NT ND BS+  Ext: No DCT +2 edema  Neuro: Awake Pleasant        RSV Bronchitis - Nebs  Afib - Rate control  Glaucoma - drops  Hypertension- cont meds   CHF - Lasix, coreg  TIA - Pradaxa  Renal Insufficiency - cont to follow creatine  Hypothyroidism - synthroid  Hyperlipidemia - cont statin  Dementia mild - Aricept  Depression - Citalopram    Stable for D/C back to NH monday

## 2018-01-07 NOTE — DISCHARGE NOTE ADULT - SECONDARY DIAGNOSIS.
Afib Acute on chronic congestive heart failure, unspecified congestive heart failure type CKD (chronic kidney disease) stage 3, GFR 30-59 ml/min

## 2018-01-08 VITALS
TEMPERATURE: 98 F | RESPIRATION RATE: 20 BRPM | HEART RATE: 70 BPM | OXYGEN SATURATION: 97 % | SYSTOLIC BLOOD PRESSURE: 107 MMHG | DIASTOLIC BLOOD PRESSURE: 55 MMHG

## 2018-01-08 PROCEDURE — 80053 COMPREHEN METABOLIC PANEL: CPT

## 2018-01-08 PROCEDURE — 94760 N-INVAS EAR/PLS OXIMETRY 1: CPT

## 2018-01-08 PROCEDURE — 87581 M.PNEUMON DNA AMP PROBE: CPT

## 2018-01-08 PROCEDURE — 84484 ASSAY OF TROPONIN QUANT: CPT

## 2018-01-08 PROCEDURE — 82550 ASSAY OF CK (CPK): CPT

## 2018-01-08 PROCEDURE — 83735 ASSAY OF MAGNESIUM: CPT

## 2018-01-08 PROCEDURE — 85027 COMPLETE CBC AUTOMATED: CPT

## 2018-01-08 PROCEDURE — 84100 ASSAY OF PHOSPHORUS: CPT

## 2018-01-08 PROCEDURE — 87798 DETECT AGENT NOS DNA AMP: CPT

## 2018-01-08 PROCEDURE — 85610 PROTHROMBIN TIME: CPT

## 2018-01-08 PROCEDURE — 94640 AIRWAY INHALATION TREATMENT: CPT

## 2018-01-08 PROCEDURE — 87633 RESP VIRUS 12-25 TARGETS: CPT

## 2018-01-08 PROCEDURE — 71045 X-RAY EXAM CHEST 1 VIEW: CPT

## 2018-01-08 PROCEDURE — 93005 ELECTROCARDIOGRAM TRACING: CPT

## 2018-01-08 PROCEDURE — 96374 THER/PROPH/DIAG INJ IV PUSH: CPT

## 2018-01-08 PROCEDURE — 85730 THROMBOPLASTIN TIME PARTIAL: CPT

## 2018-01-08 PROCEDURE — 87040 BLOOD CULTURE FOR BACTERIA: CPT

## 2018-01-08 PROCEDURE — 96375 TX/PRO/DX INJ NEW DRUG ADDON: CPT

## 2018-01-08 PROCEDURE — 87186 SC STD MICRODIL/AGAR DIL: CPT

## 2018-01-08 PROCEDURE — 83880 ASSAY OF NATRIURETIC PEPTIDE: CPT

## 2018-01-08 PROCEDURE — 87070 CULTURE OTHR SPECIMN AEROBIC: CPT

## 2018-01-08 PROCEDURE — 83605 ASSAY OF LACTIC ACID: CPT

## 2018-01-08 PROCEDURE — 99285 EMERGENCY DEPT VISIT HI MDM: CPT | Mod: 25

## 2018-01-08 PROCEDURE — 87486 CHLMYD PNEUM DNA AMP PROBE: CPT

## 2018-01-08 PROCEDURE — 80048 BASIC METABOLIC PNL TOTAL CA: CPT

## 2018-01-08 PROCEDURE — 84132 ASSAY OF SERUM POTASSIUM: CPT

## 2018-01-08 PROCEDURE — 36415 COLL VENOUS BLD VENIPUNCTURE: CPT

## 2018-01-08 RX ADMIN — Medication 1 TABLET(S): at 14:35

## 2018-01-08 RX ADMIN — CITALOPRAM 10 MILLIGRAM(S): 10 TABLET, FILM COATED ORAL at 14:35

## 2018-01-08 RX ADMIN — DABIGATRAN ETEXILATE MESYLATE 75 MILLIGRAM(S): 150 CAPSULE ORAL at 05:44

## 2018-01-08 RX ADMIN — Medication 3 MILLILITER(S): at 08:13

## 2018-01-08 RX ADMIN — Medication 175 MICROGRAM(S): at 05:44

## 2018-01-08 RX ADMIN — Medication 250 MILLIGRAM(S): at 05:44

## 2018-01-08 RX ADMIN — Medication 1 APPLICATION(S): at 14:35

## 2018-01-08 RX ADMIN — CHOLESTYRAMINE 4 GRAM(S): 4 POWDER, FOR SUSPENSION ORAL at 14:35

## 2018-01-08 RX ADMIN — LOSARTAN POTASSIUM 25 MILLIGRAM(S): 100 TABLET, FILM COATED ORAL at 05:45

## 2018-01-08 RX ADMIN — CARVEDILOL PHOSPHATE 3.12 MILLIGRAM(S): 80 CAPSULE, EXTENDED RELEASE ORAL at 05:44

## 2018-01-09 LAB
-  AMPICILLIN/SULBACTAM: SIGNIFICANT CHANGE UP
-  CEFAZOLIN: SIGNIFICANT CHANGE UP
-  CIPROFLOXACIN: SIGNIFICANT CHANGE UP
-  CLINDAMYCIN: SIGNIFICANT CHANGE UP
-  ERYTHROMYCIN: SIGNIFICANT CHANGE UP
-  GENTAMICIN: SIGNIFICANT CHANGE UP
-  LEVOFLOXACIN: SIGNIFICANT CHANGE UP
-  MOXIFLOXACIN(AEROBIC): SIGNIFICANT CHANGE UP
-  OXACILLIN: SIGNIFICANT CHANGE UP
-  PENICILLIN: SIGNIFICANT CHANGE UP
-  RIFAMPIN: SIGNIFICANT CHANGE UP
-  TETRACYCLINE: SIGNIFICANT CHANGE UP
-  TRIMETHOPRIM/SULFAMETHOXAZOLE: SIGNIFICANT CHANGE UP
-  VANCOMYCIN: SIGNIFICANT CHANGE UP
CULTURE RESULTS: SIGNIFICANT CHANGE UP
METHOD TYPE: SIGNIFICANT CHANGE UP
ORGANISM # SPEC MICROSCOPIC CNT: SIGNIFICANT CHANGE UP
ORGANISM # SPEC MICROSCOPIC CNT: SIGNIFICANT CHANGE UP
SPECIMEN SOURCE: SIGNIFICANT CHANGE UP

## 2018-01-10 LAB
CULTURE RESULTS: SIGNIFICANT CHANGE UP
SPECIMEN SOURCE: SIGNIFICANT CHANGE UP

## 2020-12-01 NOTE — ED ADULT NURSE NOTE - NSWEIGHTCALCTOOLDRUG_GEN_A_CORE
Patient notified of breast biopsy results.     Final Pathologic Diagnosis Breast, left 5:00, biopsy:       - Breast parenchyma with cellular stroma, see note       - No atypia or carcinoma seen   Note: Sections show focally distorted benign epithelium with cellular stroma.   Immunohistochemical studies show that SMA, cytokeratin 5/6 and p63   immunostains highlight myoepithelial cells. SMA immunostain highlights   cellular stroma. The differential diagnosis includes fibroadenoma and myloid   harmatoma.          Instructed to repeat diagnostic mammogram in 6 months     used

## 2020-12-16 NOTE — ED ADULT NURSE REASSESSMENT NOTE - NS ED NURSE REASSESS COMMENT FT1
patient awake and alert - requesting water - toelrated well - continue to monitor. offers no complaints
patient sleeping continue to monitor - patient remains on cardiac monitor showing paced rhtyhm. patient awaiting possible discharge in am.
  KAREL CORTÉS  54y Female    CHIEF COMPLAINT:    Patient is a 54y old  Female who presents with a chief complaint of Shortness of breath (16 Dec 2020 09:58)      INTERVAL HPI/OVERNIGHT EVENTS:    Patient seen and examined. Feels much better. Only gets some cp on coughing. No sob    ROS: All other systems are negative.    Vital Signs:    T(F): 100.4 (20 @ 08:10), Max: 101.7 (12-15-20 @ 20:28)  HR: 110 (20 @ 05:16) (110 - 121)  BP: 117/59 (20 @ 05:16) (117/59 - 131/60)  RR: 18 (20 @ 05:16) (18 - 18)  SpO2: --  I&O's Summary    15 Dec 2020 07:01  -  16 Dec 2020 07:00  --------------------------------------------------------  IN: 680 mL / OUT: 500 mL / NET: 180 mL      Daily     Daily Weight in k.4 (16 Dec 2020 05:16)  CAPILLARY BLOOD GLUCOSE          PHYSICAL EXAM:    GENERAL:  NAD  SKIN: No rashes or lesions  HENT: Atraumatic Normocephalic. PERRL. Moist membranes.  NECK: Supple, No JVD. No lymphadenopathy.  PULMONARY: CTA B/L. No wheezing. No rales  CVS: Normal S1, S2. Rate and Rhythm are regular. No murmurs.  ABDOMEN/GI: Soft, Nontender, Nondistended; BS present. Fungal rash underneath the breast  EXTREMITIES: Peripheral pulses intact. No edema B/L LE.  NEUROLOGIC:  No motor or sensory deficit.  PSYCH: Alert & oriented x 3    Consultant(s) Notes Reviewed:  [x ] YES  [ ] NO  Care Discussed with Consultants/Other Providers [ x] YES  [ ] NO    EKG reviewed  Telemetry reviewed    LABS:                        11.2   7.88  )-----------( 203      ( 16 Dec 2020 12:30 )             34.5     12-15    137  |  100  |  7<L>  ----------------------------<  95  4.3   |  27  |  0.7    Ca    9.1      15 Dec 2020 04:30  Mg     1.9     12-15    TPro  6.1  /  Alb  3.3<L>  /  TBili  2.7<H>  /  DBili  x   /  AST  13  /  ALT  19  /  AlkPhos  69  12-15    PT/INR - ( 15 Dec 2020 04:30 )   PT: 15.50 sec;   INR: 1.35 ratio           Serum Pro-Brain Natriuretic Peptide: 39 pg/mL (20 @ 17:00)    Trop <0.01, CKMB --, CK --, 12-15-20 @ 04:30  Trop <0.01, CKMB --, CK --, 20 @ 17:00        RADIOLOGY & ADDITIONAL TESTS:    < from: [facility] Duplex Lower Ext Vein Scan, Bilat (12.15.20 @ 10:39) >    Impression:    Left popliteal and gastrocnemius veins appear to be thrombosed  Right lower extremity free from thrombus    < end of copied text >    Imaging or report Personally Reviewed:  [ ] YES  [ ] NO    Medications:  Standing  chlorhexidine 4% Liquid 1 Application(s) Topical <User Schedule>  clotrimazole 1% Cream 1 Application(s) Topical two times a day  famotidine    Tablet 20 milliGRAM(s) Oral daily  guaiFENesin  milliGRAM(s) Oral every 12 hours  influenza   Vaccine 0.5 milliLiter(s) IntraMuscular once  rivaroxaban 15 milliGRAM(s) Oral two times a day with meals    PRN Meds  acetaminophen   Tablet .. 650 milliGRAM(s) Oral every 6 hours PRN      Case discussed with resident    Care discussed with pt/family        
maik received from off going RN - patient currently sleeping at this time, patient on cardiac monitor showing paced rhythm at this time. continue to monitor
patient awake and alert. no signs of distress noted,
zaheer awake and alert denies chest pain, denies respiratory distress at this time, states that she is hoping to be going home tomorrow states all her tests were done. breath sounds clear abdomen soft non tender saline lock to right forearm intact and patent.
  KAREL CORTÉS  54y Female    CHIEF COMPLAINT:    Patient is a 54y old  Female who presents with a chief complaint of Shortness of breath (16 Dec 2020 09:58)      INTERVAL HPI/OVERNIGHT EVENTS:    Patient seen and examined. Feels much better. Only gets some cp on coughing. No sob. Still spiking fever.     ROS: All other systems are negative.    Vital Signs:    T(F): 100.4 (20 @ 08:10), Max: 101.7 (12-15-20 @ 20:28)  HR: 110 (20 @ 05:16) (110 - 121)  BP: 117/59 (20 @ 05:16) (117/59 - 131/60)  RR: 18 (20 @ 05:16) (18 - 18)  SpO2: --  I&O's Summary    15 Dec 2020 07:01  -  16 Dec 2020 07:00  --------------------------------------------------------  IN: 680 mL / OUT: 500 mL / NET: 180 mL      Daily     Daily Weight in k.4 (16 Dec 2020 05:16)  CAPILLARY BLOOD GLUCOSE          PHYSICAL EXAM:    GENERAL:  NAD  SKIN: No rashes or lesions  HENT: Atraumatic Normocephalic. PERRL. Moist membranes.  NECK: Supple, No JVD. No lymphadenopathy.  PULMONARY: CTA B/L. No wheezing. No rales  CVS: Normal S1, S2. Rate and Rhythm are regular. No murmurs.  ABDOMEN/GI: Soft, Nontender, Nondistended; BS present. Fungal rash underneath the breast  EXTREMITIES: Peripheral pulses intact. No edema B/L LE.  NEUROLOGIC:  No motor or sensory deficit.  PSYCH: Alert & oriented x 3    Consultant(s) Notes Reviewed:  [x ] YES  [ ] NO  Care Discussed with Consultants/Other Providers [ x] YES  [ ] NO    EKG reviewed  Telemetry reviewed    LABS:                        11.2   7.88  )-----------( 203      ( 16 Dec 2020 12:30 )             34.5     12-15    137  |  100  |  7<L>  ----------------------------<  95  4.3   |  27  |  0.7    Ca    9.1      15 Dec 2020 04:30  Mg     1.9     12-15    TPro  6.1  /  Alb  3.3<L>  /  TBili  2.7<H>  /  DBili  x   /  AST  13  /  ALT  19  /  AlkPhos  69  12-15    PT/INR - ( 15 Dec 2020 04:30 )   PT: 15.50 sec;   INR: 1.35 ratio           Serum Pro-Brain Natriuretic Peptide: 39 pg/mL (20 @ 17:00)    Trop <0.01, CKMB --, CK --, 12-15-20 @ 04:30  Trop <0.01, CKMB --, CK --, 20 @ 17:00        RADIOLOGY & ADDITIONAL TESTS:    < from: [facility] Duplex Lower Ext Vein Scan, Bilat (12.15.20 @ 10:39) >    Impression:    Left popliteal and gastrocnemius veins appear to be thrombosed  Right lower extremity free from thrombus    < end of copied text >    Imaging or report Personally Reviewed:  [ ] YES  [ ] NO    Medications:  Standing  chlorhexidine 4% Liquid 1 Application(s) Topical <User Schedule>  clotrimazole 1% Cream 1 Application(s) Topical two times a day  famotidine    Tablet 20 milliGRAM(s) Oral daily  guaiFENesin  milliGRAM(s) Oral every 12 hours  influenza   Vaccine 0.5 milliLiter(s) IntraMuscular once  rivaroxaban 15 milliGRAM(s) Oral two times a day with meals    PRN Meds  acetaminophen   Tablet .. 650 milliGRAM(s) Oral every 6 hours PRN      Case discussed with resident    Care discussed with pt/family

## 2022-04-18 NOTE — ED PROVIDER NOTE - NSTIMEPROVIDERCAREINITIATE_GEN_ER
Date last seen: 11/10/21    Date of next visit: 07/06/22    Med Requested:    Disp Refills Start End    zolpidem (AMBIEN CR) 6.25 MG CR tablet 30 tablet 5 10/11/2021       Sig: TAKE 1 TABLET BY MOUTH EVERY NIGHT AS NEEDED FOR SLEEP        Class: Eprescribe      Routed to MD for review and approval.       04-Jan-2018 19:40

## 2024-04-15 NOTE — H&P ADULT. - EXTREMITIES COMMENTS
Pt presents to ED via EMS for stroke like symptoms. Pt presents GCS 15, has a patent airway, and does not appear in distress.   Pt reports she's had a headache for 4x days, and yesterday (4/14) developed generalized weakness at 09:00, and at 15:00 she developed slurring and L sided weakness. Pt denies thinners, denies recent head strikes, and states positive for HTN.    bilateral LE lymphedema unable to palpate pulses of LE

## 2025-03-03 NOTE — PATIENT PROFILE ADULT. - NSTOBACCONEVERSMOKERY/N_GEN_A
Patient called, state she scheduled for Hernia surgery and post op appointment with Dr. Boyd Muniz. Questions if an insurance referral is needed. Patient request  a callback with clarification. Please advise patient at [phone], if any further questions.    No